# Patient Record
Sex: MALE | Race: WHITE | Employment: FULL TIME | URBAN - METROPOLITAN AREA
[De-identification: names, ages, dates, MRNs, and addresses within clinical notes are randomized per-mention and may not be internally consistent; named-entity substitution may affect disease eponyms.]

---

## 2017-01-19 ENCOUNTER — PATIENT MESSAGE (OUTPATIENT)
Dept: FAMILY MEDICINE CLINIC | Age: 42
End: 2017-01-19

## 2017-02-01 PROBLEM — I87.002 POST-THROMBOTIC SYNDROME OF LEFT LOWER EXTREMITY: Status: ACTIVE | Noted: 2017-02-01

## 2017-02-01 PROBLEM — I82.409 RECURRENT DEEP VEIN THROMBOSIS (DVT) (HCC): Status: ACTIVE | Noted: 2017-02-01

## 2017-02-01 PROBLEM — I87.399 CHRONIC VENOUS HYPERTENSION WITH COMPLICATION: Status: ACTIVE | Noted: 2017-02-01

## 2017-03-14 PROBLEM — I82.429 DEEP VEIN THROMBOSIS (DVT) OF ILIAC VEIN (HCC): Status: ACTIVE | Noted: 2017-03-14

## 2017-03-16 ENCOUNTER — HOSPITAL ENCOUNTER (OUTPATIENT)
Dept: INTERVENTIONAL RADIOLOGY/VASCULAR | Age: 42
Discharge: HOME | End: 2017-03-16
Attending: RADIOLOGY | Admitting: RADIOLOGY

## 2017-03-16 ENCOUNTER — PATIENT MESSAGE (OUTPATIENT)
Dept: FAMILY MEDICINE CLINIC | Age: 42
End: 2017-03-16

## 2017-05-11 ENCOUNTER — OFFICE VISIT (OUTPATIENT)
Dept: FAMILY MEDICINE CLINIC | Age: 42
End: 2017-05-11

## 2017-05-11 VITALS
SYSTOLIC BLOOD PRESSURE: 132 MMHG | BODY MASS INDEX: 33.56 KG/M2 | RESPIRATION RATE: 18 BRPM | OXYGEN SATURATION: 99 % | DIASTOLIC BLOOD PRESSURE: 80 MMHG | HEART RATE: 78 BPM | HEIGHT: 68 IN | WEIGHT: 221.4 LBS

## 2017-05-11 DIAGNOSIS — M54.42 CHRONIC BILATERAL LOW BACK PAIN WITH BILATERAL SCIATICA: Primary | ICD-10-CM

## 2017-05-11 DIAGNOSIS — Z79.01 LONG TERM CURRENT USE OF ANTICOAGULANT THERAPY: ICD-10-CM

## 2017-05-11 DIAGNOSIS — I82.429 DEEP VEIN THROMBOSIS (DVT) OF ILIAC VEIN, UNSPECIFIED CHRONICITY, UNSPECIFIED LATERALITY (HCC): ICD-10-CM

## 2017-05-11 DIAGNOSIS — M54.41 CHRONIC BILATERAL LOW BACK PAIN WITH BILATERAL SCIATICA: Primary | ICD-10-CM

## 2017-05-11 DIAGNOSIS — I82.409 RECURRENT DEEP VEIN THROMBOSIS (DVT) (HCC): ICD-10-CM

## 2017-05-11 DIAGNOSIS — M51.26 HERNIATED LUMBAR DISC WITHOUT MYELOPATHY: ICD-10-CM

## 2017-05-11 DIAGNOSIS — G89.29 CHRONIC BILATERAL LOW BACK PAIN WITH BILATERAL SCIATICA: Primary | ICD-10-CM

## 2017-05-11 PROCEDURE — 99213 OFFICE O/P EST LOW 20 MIN: CPT | Performed by: FAMILY MEDICINE

## 2017-05-11 RX ORDER — GABAPENTIN 100 MG/1
100 CAPSULE ORAL 3 TIMES DAILY
Qty: 270 CAPSULE | Refills: 1 | Status: SHIPPED | OUTPATIENT
Start: 2017-05-11 | End: 2018-01-08 | Stop reason: SDUPTHER

## 2017-05-11 ASSESSMENT — PATIENT HEALTH QUESTIONNAIRE - PHQ9
1. LITTLE INTEREST OR PLEASURE IN DOING THINGS: 0
2. FEELING DOWN, DEPRESSED OR HOPELESS: 0
SUM OF ALL RESPONSES TO PHQ9 QUESTIONS 1 & 2: 0
SUM OF ALL RESPONSES TO PHQ QUESTIONS 1-9: 0

## 2017-08-29 ENCOUNTER — HOSPITAL ENCOUNTER (OUTPATIENT)
Dept: PHYSICAL THERAPY | Age: 42
Discharge: HOME | End: 2017-09-30
Attending: PAIN MEDICINE | Admitting: PAIN MEDICINE

## 2017-09-15 ASSESSMENT — PAIN DESCRIPTION - PAIN TYPE: TYPE: CHRONIC PAIN

## 2017-09-15 ASSESSMENT — PAIN DESCRIPTION - PROGRESSION: CLINICAL_PROGRESSION: GRADUALLY WORSENING

## 2017-09-15 ASSESSMENT — PAIN DESCRIPTION - ONSET: ONSET: ON-GOING

## 2017-09-15 NOTE — PROGRESS NOTES
Physical Therapy  Initial Assessment  Date: 9/15/2017  Patient Name: Hayden Alexis  MRN: [id number]  : 1975     Treatment Diagnosis: Neuro deficits and weakness L leg; sesory loss L leg; exaccerbation of LB and doreen LE pain; chronic edema with blood clots L leg; decreased functional use of LB and L leg    Restrictions  Restrictions/Precautions  Implants present? : Metal implants (Rods in LB)    Subjective   General  Chart Reviewed: Yes  Patient assessed for rehabilitation services?: Yes  Additional Pertinent Hx: Back surgery x 4 episodes;  last back surgery 14 years ago with wilbur placement  Family / Caregiver Present: No  Referring Practitioner: Porter Mcgill  Diagnosis: Spinal Stenosis  Follows Commands: Within Functional Limits  General Comment  Comments: Pt. reports can only sit comfortably x 10 min. , can walk only 30 min before pain increases in back. 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 PT Visit Information  PT Insurance Information: [employer]  Subjective  Subjective: Pt. reports approx. one year ago he developed increasing pain in back and down legs to top of feet. Pt. reports he has numb places in LB and legs. Pt. has drop foot in Left leg. Pt. reports he has had blood clots x 4 episodes in L leg, and he wears compression stockings with long pants and is on blood thinner. Pain Screening  Patient Currently in Pain: Yes  Pain Assessment  Pain Assessment: 0-10  Pain Level:  (Goes from 5/10 and higher.)  Pain Type: Chronic pain  Pain Descriptors: Aching;Tightness;Pins and needles  Pain Onset: On-going  Clinical Progression: Gradually worsening  Effect of Pain on Daily Activities: Limits walking and sitting activities.   Pain Intervention(s):  (TENS, back brace)  Vital Signs  Patient Currently in Pain: Yes    Vision/Hearing  Vision  Vision: Within Functional Limits  Hearing  Hearing: Within functional motor and sensory loss L Leg which impacts on sitting and standing endurance, gait form, and functional use of LB and L LE;patient's goal is to decrease LB and doreen leg pain and to strengthen L leg ;patient reports that weakness and pain  limits activities including those noted; PT to address patient's goals, impairments and activity limitations with skilled interventions checked in plan of care;patient's level of function prior to onset of exacerbation of pain was limited; did not observe any barriers to learning during PT eval; learning preferences include demonstration, practice, and handouts; patient expressed understanding of HEP; patient appears to be motivated to participate in an active PT program and to be compliant with HEP expectations;patient assisted in developing treatment plan and goals; no DME is currently being used;      Current functional level (based on )   : Oswestry  Conditions Requiring Skilled Therapeutic Intervention  Body structures, Functions, Activity limitations: Decreased functional mobility ; Decreased ROM; Decreased strength;Decreased endurance  Treatment Diagnosis: Neuro deficits and weakness L leg; sesory loss L leg; exaccerbation of LB and doreen LE pain; chronic edema with blood clots L leg; decreased functional use of LB and L leg  Prognosis: Fair  Decision Making: Medium Complexity  Patient Education: Posture and body mechanics training; HEP  REQUIRES PT FOLLOW UP: Yes  Discharge Recommendations: Outpatient PT  Activity Tolerance  Activity Tolerance: Patient limited by pain; Patient limited by fatigue         Plan        G-Code       OutComes Score                                                     Goals  Short term goals  Time Frame for Short term goals: 6 wks.   Short term goal 1: Ind with HEP with good compliance  Short term goal 2: Decrease frequecy of extreme pain to once/wk from current daily extreme pain  Short term goal 3: Oswestry 20/50 score       Therapy Time   Individual Concurrent Group Co-treatment   Time In 0940         Time Out  [address] [facility], PT

## 2017-09-15 NOTE — PLAN OF CARE
Oswestry    Plan of Care/Treatment to date:  [x] Therapeutic Exercise    [] Aquatics:  [] Therapeutic Activity    [] Ultrasound  [] Elec Stimulation  [] Gait Training     [] Cervical Traction [] Lumbar Traction  [] Neuromuscular Re-education [] Cold/hotpack [] Iontophoresis   [x] Instruction in HEP       [x] Manual Therapy     [] vasopneumatic            [x] Self care home management        []Dry needling trigger point point/pain management    ? Frequency/Duration:  # Days per week: [] 1 day # Weeks: [] 1 week [x] 5 weeks     [x] 2 days? [] 2 weeks [] 6 weeks     [] 3 days   [] 3 weeks [] 7 weeks     [] 4 days   [] 4 weeks [] 8 weeks    Rehab Potential/Progress: [] Excellent [] Good [] Fair  [] Poor     Goals:    Short term goals  Time Frame for Short term goals: 6 wks. Short term goal 1: Ind with HEP with good compliance  Short term goal 2: Decrease frequecy of extreme pain to once/wk from current daily extreme pain  Short term goal 3: Oswestry 20/50 score     Electronically signed by:  Carmina Carcamo, PT, 9/15/2017, 3:23 PM              If you have any questions or concerns, please don't hesitate to call.   Thank you for your referral.      Physician Signature:_________________Date:____________Time: ________  By signing above, therapists plan is approved by physician

## 2017-10-01 ENCOUNTER — HOSPITAL ENCOUNTER (OUTPATIENT)
Dept: OTHER | Age: 42
Discharge: HOME | End: 2017-10-11
Attending: PAIN MEDICINE | Admitting: PAIN MEDICINE

## 2017-11-15 ENCOUNTER — TELEPHONE (OUTPATIENT)
Dept: FAMILY MEDICINE CLINIC | Age: 42
End: 2017-11-15

## 2017-11-15 NOTE — TELEPHONE ENCOUNTER
11-14-17 Patient was no call no show for todays appt.  I Called patient at home no answer LM on  requesting patient call and reschedule this appt (EV)

## 2017-12-14 ENCOUNTER — OFFICE VISIT (OUTPATIENT)
Dept: FAMILY MEDICINE CLINIC | Age: 42
End: 2017-12-14

## 2017-12-14 VITALS
TEMPERATURE: 97.6 F | HEIGHT: 68 IN | BODY MASS INDEX: 34.43 KG/M2 | SYSTOLIC BLOOD PRESSURE: 126 MMHG | OXYGEN SATURATION: 96 % | WEIGHT: 227.2 LBS | DIASTOLIC BLOOD PRESSURE: 86 MMHG | HEART RATE: 86 BPM

## 2017-12-14 DIAGNOSIS — J20.9 ACUTE BRONCHITIS, UNSPECIFIED ORGANISM: Primary | ICD-10-CM

## 2017-12-14 PROCEDURE — 99213 OFFICE O/P EST LOW 20 MIN: CPT | Performed by: FAMILY MEDICINE

## 2017-12-14 RX ORDER — METHYLPREDNISOLONE 4 MG/1
TABLET ORAL
Qty: 1 KIT | Refills: 0 | Status: SHIPPED | OUTPATIENT
Start: 2017-12-14 | End: 2018-01-08 | Stop reason: ALTCHOICE

## 2017-12-14 RX ORDER — DEXTROMETHORPHAN HYDROBROMIDE AND PROMETHAZINE HYDROCHLORIDE 15; 6.25 MG/5ML; MG/5ML
5 SYRUP ORAL EVERY 6 HOURS PRN
Qty: 118 ML | Refills: 0 | Status: SHIPPED | OUTPATIENT
Start: 2017-12-14 | End: 2017-12-21

## 2017-12-14 RX ORDER — CELECOXIB 100 MG/1
CAPSULE ORAL
COMMUNITY
Start: 2017-11-02 | End: 2018-01-08 | Stop reason: CLARIF

## 2017-12-14 RX ORDER — TIZANIDINE 4 MG/1
TABLET ORAL
COMMUNITY
Start: 2017-12-11 | End: 2018-01-08 | Stop reason: SDUPTHER

## 2017-12-14 RX ORDER — AZITHROMYCIN 250 MG/1
TABLET, FILM COATED ORAL
Qty: 1 PACKET | Refills: 0 | Status: SHIPPED | OUTPATIENT
Start: 2017-12-14 | End: 2018-01-08 | Stop reason: ALTCHOICE

## 2017-12-14 ASSESSMENT — ENCOUNTER SYMPTOMS
BACK PAIN: 0
VOMITING: 0
EYE DISCHARGE: 0
SHORTNESS OF BREATH: 0
COUGH: 1
DIARRHEA: 0
WHEEZING: 0
SINUS PRESSURE: 0
NAUSEA: 0
SORE THROAT: 0

## 2017-12-14 NOTE — PATIENT INSTRUCTIONS
Patient Education        Bronchitis: Care Instructions  Your Care Instructions    Bronchitis is inflammation of the bronchial tubes, which carry air to the lungs. The tubes swell and produce mucus, or phlegm. The mucus and inflamed bronchial tubes make you cough. You may have trouble breathing. Most cases of bronchitis are caused by viruses like those that cause colds. Antibiotics usually do not help and they may be harmful. Bronchitis usually develops rapidly and lasts about 2 to 3 weeks in otherwise healthy people. Follow-up care is a key part of your treatment and safety. Be sure to make and go to all appointments, and call your doctor if you are having problems. It's also a good idea to know your test results and keep a list of the medicines you take. How can you care for yourself at home? · Take all medicines exactly as prescribed. Call your doctor if you think you are having a problem with your medicine. · Get some extra rest.  · Take an over-the-counter pain medicine, such as acetaminophen (Tylenol), ibuprofen (Advil, Motrin), or naproxen (Aleve) to reduce fever and relieve body aches. Read and follow all instructions on the label. · Do not take two or more pain medicines at the same time unless the doctor told you to. Many pain medicines have acetaminophen, which is Tylenol. Too much acetaminophen (Tylenol) can be harmful. · Take an over-the-counter cough medicine that contains dextromethorphan to help quiet a dry, hacking cough so that you can sleep. Avoid cough medicines that have more than one active ingredient. Read and follow all instructions on the label. · Breathe moist air from a humidifier, hot shower, or sink filled with hot water. The heat and moisture will thin mucus so you can cough it out. · Do not smoke. Smoking can make bronchitis worse. If you need help quitting, talk to your doctor about stop-smoking programs and medicines.  These can increase your chances of quitting for good.  When should you call for help? Call 911 anytime you think you may need emergency care. For example, call if:  ? · You have severe trouble breathing. ?Call your doctor now or seek immediate medical care if:  ? · You have new or worse trouble breathing. ? · You cough up dark brown or bloody mucus (sputum). ? · You have a new or higher fever. ? · You have a new rash. ? Watch closely for changes in your health, and be sure to contact your doctor if:  ? · You cough more deeply or more often, especially if you notice more mucus or a change in the color of your mucus. ? · You are not getting better as expected. Where can you learn more? Go to [email or web address]://[email or web address].[email or web address]. org and sign in to your Mochila account. Enter H333 in the Lasso Logic box to learn more about \"Bronchitis: Care Instructions. \"     If you do not have an account, please click on the \"Sign Up Now\" link. Current as of: May 12, 2017  Content Version: 11.4  © 0982-3759 Healthwise, Incorporated. Care instructions adapted under license by [facility] [place] ([facility]). If you have questions about a medical condition or this instruction, always ask your healthcare professional. [address] any warranty or liability for your use of this information. We are committed to providing you the best care possible. If you receive a survey after visiting one of our offices, please take time to share your experience concerning your physician office visit. These surveys are confidential and no health information about you is shared. We are eager to improve for you and we are counting on your feedback to help make that happen.

## 2017-12-14 NOTE — PROGRESS NOTES
[facility]  1975  38 y.o. SUBJECT JV:    Chief Complaint   Patient presents with    Cough     x 2 weeks,dry cough, has tried Nyquil, Dayquil, sinus medicine. They didn't help. Denies fever.  Pharyngitis     x 2 weeks, intermittent. Patient with above. Better and then qworse in last 4 days. Cough has increased with headache as  Result. Dry non productive cough. No known sick contacts. Cough   This is a new problem. The current episode started 1 to 4 weeks ago (2 weeks). The problem has been gradually worsening. The problem occurs every few minutes. The cough is non-productive. Associated symptoms include chills and nasal congestion. Pertinent negatives include no chest pain, ear pain, fever, headaches, rash, sore throat, shortness of breath or wheezing. Nothing aggravates the symptoms. He has tried OTC cough suppressant and rest for the symptoms. The treatment provided no relief. There is no history of asthma, bronchitis or environmental allergies. Review of Systems   Constitutional: Positive for chills. Negative for fatigue and fever. HENT: Negative for congestion, ear pain, sinus pressure and sore throat. Eyes: Negative for discharge and visual disturbance. Respiratory: Positive for cough. Negative for shortness of breath and wheezing. Cardiovascular: Negative for chest pain and leg swelling. Gastrointestinal: Negative for diarrhea, nausea and vomiting. Genitourinary: Negative for dysuria, frequency and hematuria. Musculoskeletal: Negative for back pain and gait problem. Skin: Negative for rash. Allergic/Immunologic: Negative for environmental allergies and food allergies. Neurological: Negative for dizziness and headaches. Psychiatric/Behavioral: Negative for behavioral problems, sleep disturbance and suicidal ideas. The patient is not nervous/anxious. Past Medical, Family, and Social History have been reviewed today.     OBJECTIVE:     /86 Pulse 86   Temp 97.6 °F (36.4 °C) (Oral)   Ht 5' 7.75\" (1.721 m)   Wt 227 lb 3.2 oz (103.1 kg)   SpO2 96%   BMI 34.80 kg/m²     Physical Exam   Constitutional: He is oriented to person, place, and time. He appears well-developed and well-nourished. He appears ill. HENT:   Head: Normocephalic and atraumatic. Right Ear: External ear normal.   Left Ear: External ear normal.   Nose: Nose normal.   Mouth/Throat: Oropharynx is clear and moist.   Eyes: Conjunctivae and EOM are normal. Pupils are equal, round, and reactive to light. No scleral icterus. Neck: Normal range of motion. Neck supple. Cardiovascular: Normal rate, regular rhythm, normal heart sounds and intact distal pulses. Exam reveals no gallop and no friction rub. No murmur heard. Pulmonary/Chest: Effort normal. No respiratory distress. He has decreased breath sounds in the right upper field, the right middle field, the right lower field, the left upper field, the left middle field and the left lower field. He has no wheezes. He has no rales. Musculoskeletal: Normal range of motion. Neurological: He is alert and oriented to person, place, and time. Skin: Skin is warm and dry. No rash noted. Psychiatric: He has a normal mood and affect. His behavior is normal. Judgment and thought content normal.         ASSESSMENT/ PLAN:    1. Acute bronchitis, unspecified organism  Patient with symptoms for 2 weeks. Will treat with OMI manning as this has been helpful in the past, with Medrol dose kerri and cough syrup. Patient encouraged to increase fluids and rest. Will call early next week to see how he is feeling.   - azithromycin (ZITHROMAX) 250 MG tablet; Take 2 tabs (500 mg) on Day 1, and take 1 tab (250 mg) on days 2 through 5. Dispense: 1 packet; Refill: 0  - methylPREDNISolone (MEDROL DOSEPACK) 4 MG tablet; Take by mouth. Dispense: 1 kit; Refill: 0  - promethazine-dextromethorphan (PROMETHAZINE-DM) 6.25-15 MG/5ML syrup;  Take 5 mLs by mouth every 6

## 2017-12-15 DIAGNOSIS — N52.02 CORPORO-VENOUS OCCLUSIVE ERECTILE DYSFUNCTION: ICD-10-CM

## 2017-12-15 RX ORDER — SILDENAFIL 50 MG/1
50 TABLET, FILM COATED ORAL PRN
Qty: 30 TABLET | Refills: 3 | Status: SHIPPED | OUTPATIENT
Start: 2017-12-15 | End: 2017-12-31 | Stop reason: SDUPTHER

## 2017-12-15 NOTE — TELEPHONE ENCOUNTER
From: Bibiana Correa  Sent: 12/14/2017 4:12 PM EST  Subject: Medication Renewal Request    Bibiana Correa would like a refill of the following medications:  sildenafil (VIAGRA) 50 MG tablet Ivory Haider MD]    Preferred pharmacy: [address] [place] [address] , [address] [facility] [phone] - F [phone]    Comment:

## 2017-12-18 ENCOUNTER — TELEPHONE (OUTPATIENT)
Dept: FAMILY MEDICINE CLINIC | Age: 42
End: 2017-12-18

## 2017-12-18 NOTE — TELEPHONE ENCOUNTER
Called patient to follow up on OV 12/14/2017. States that he is doing a lot better and will call with any issues or concerns.

## 2017-12-20 ENCOUNTER — PATIENT MESSAGE (OUTPATIENT)
Dept: FAMILY MEDICINE CLINIC | Age: 42
End: 2017-12-20

## 2017-12-20 DIAGNOSIS — Z79.01 LONG TERM CURRENT USE OF ANTICOAGULANT THERAPY: ICD-10-CM

## 2017-12-20 DIAGNOSIS — I82.402 DVT, RECURRENT, LOWER EXTREMITY, ACUTE, LEFT (HCC): ICD-10-CM

## 2017-12-20 NOTE — TELEPHONE ENCOUNTER
From: Mendel Spaniel  To: Karoline Mary MD  Sent: 12/20/2017 5:06 PM EST  Subject: Prescription Question    Went to get this month's prodaxa prescription and they said I no longer have prescription. I am completely out of prodaxa now. Can you call in New prodaxa prescription?  Thanks

## 2017-12-22 RX ORDER — DABIGATRAN ETEXILATE 150 MG/1
CAPSULE, COATED PELLETS ORAL
Qty: 180 CAPSULE | Refills: 1 | Status: SHIPPED | OUTPATIENT
Start: 2017-12-22 | End: 2018-01-08 | Stop reason: CLARIF

## 2017-12-31 DIAGNOSIS — N52.02 CORPORO-VENOUS OCCLUSIVE ERECTILE DYSFUNCTION: ICD-10-CM

## 2018-01-02 RX ORDER — SILDENAFIL CITRATE 50 MG
TABLET ORAL
Qty: 30 TABLET | Refills: 1 | Status: SHIPPED | OUTPATIENT
Start: 2018-01-02 | End: 2020-07-31 | Stop reason: SDUPTHER

## 2018-01-04 ENCOUNTER — PATIENT MESSAGE (OUTPATIENT)
Dept: FAMILY MEDICINE CLINIC | Age: 43
End: 2018-01-04

## 2018-01-08 ENCOUNTER — OFFICE VISIT (OUTPATIENT)
Dept: FAMILY MEDICINE CLINIC | Age: 43
End: 2018-01-08

## 2018-01-08 VITALS
RESPIRATION RATE: 18 BRPM | HEART RATE: 81 BPM | OXYGEN SATURATION: 98 % | BODY MASS INDEX: 35.22 KG/M2 | HEIGHT: 68 IN | SYSTOLIC BLOOD PRESSURE: 136 MMHG | DIASTOLIC BLOOD PRESSURE: 84 MMHG | WEIGHT: 232.4 LBS

## 2018-01-08 DIAGNOSIS — M54.16 BILATERAL LUMBAR RADICULOPATHY: ICD-10-CM

## 2018-01-08 DIAGNOSIS — Z79.01 LONG TERM CURRENT USE OF ANTICOAGULANT THERAPY: Primary | ICD-10-CM

## 2018-01-08 DIAGNOSIS — I82.409 RECURRENT DEEP VEIN THROMBOSIS (DVT) (HCC): ICD-10-CM

## 2018-01-08 DIAGNOSIS — R76.0 LUPUS ANTICOAGULANT POSITIVE: ICD-10-CM

## 2018-01-08 DIAGNOSIS — M54.42 CHRONIC BILATERAL LOW BACK PAIN WITH BILATERAL SCIATICA: Primary | ICD-10-CM

## 2018-01-08 DIAGNOSIS — J01.00 ACUTE NON-RECURRENT MAXILLARY SINUSITIS: ICD-10-CM

## 2018-01-08 DIAGNOSIS — M54.41 CHRONIC BILATERAL LOW BACK PAIN WITH BILATERAL SCIATICA: Primary | ICD-10-CM

## 2018-01-08 DIAGNOSIS — G89.29 CHRONIC BILATERAL LOW BACK PAIN WITH BILATERAL SCIATICA: Primary | ICD-10-CM

## 2018-01-08 DIAGNOSIS — Z23 NEED FOR INFLUENZA VACCINATION: ICD-10-CM

## 2018-01-08 DIAGNOSIS — M54.17 LUMBOSACRAL RADICULOPATHY AT L5: ICD-10-CM

## 2018-01-08 DIAGNOSIS — M21.372 FOOT DROP, LEFT: ICD-10-CM

## 2018-01-08 PROCEDURE — 90686 IIV4 VACC NO PRSV 0.5 ML IM: CPT | Performed by: FAMILY MEDICINE

## 2018-01-08 PROCEDURE — 90471 IMMUNIZATION ADMIN: CPT | Performed by: FAMILY MEDICINE

## 2018-01-08 PROCEDURE — 99213 OFFICE O/P EST LOW 20 MIN: CPT | Performed by: FAMILY MEDICINE

## 2018-01-08 RX ORDER — GABAPENTIN 100 MG/1
100 CAPSULE ORAL 3 TIMES DAILY
Qty: 360 CAPSULE | Refills: 3 | Status: SHIPPED | OUTPATIENT
Start: 2018-01-08 | End: 2018-03-20 | Stop reason: SDUPTHER

## 2018-01-08 RX ORDER — DULOXETIN HYDROCHLORIDE 60 MG/1
60 CAPSULE, DELAYED RELEASE ORAL DAILY
Qty: 90 CAPSULE | Refills: 3 | Status: SHIPPED | OUTPATIENT
Start: 2018-01-08 | End: 2018-03-02 | Stop reason: SDUPTHER

## 2018-01-08 RX ORDER — TIZANIDINE 4 MG/1
4 TABLET ORAL EVERY 8 HOURS PRN
Qty: 90 TABLET | Refills: 3 | Status: SHIPPED | OUTPATIENT
Start: 2018-01-08 | End: 2019-01-08 | Stop reason: ALTCHOICE

## 2018-02-25 ENCOUNTER — PATIENT MESSAGE (OUTPATIENT)
Dept: FAMILY MEDICINE CLINIC | Age: 43
End: 2018-02-25

## 2018-02-25 DIAGNOSIS — M54.16 BILATERAL LUMBAR RADICULOPATHY: ICD-10-CM

## 2018-02-25 DIAGNOSIS — M21.372 FOOT DROP, LEFT: ICD-10-CM

## 2018-02-25 DIAGNOSIS — M54.17 LUMBOSACRAL RADICULOPATHY AT L5: ICD-10-CM

## 2018-02-25 DIAGNOSIS — M54.41 CHRONIC BILATERAL LOW BACK PAIN WITH BILATERAL SCIATICA: ICD-10-CM

## 2018-02-25 DIAGNOSIS — G89.29 CHRONIC BILATERAL LOW BACK PAIN WITH BILATERAL SCIATICA: ICD-10-CM

## 2018-02-25 DIAGNOSIS — M54.42 CHRONIC BILATERAL LOW BACK PAIN WITH BILATERAL SCIATICA: ICD-10-CM

## 2018-02-26 NOTE — TELEPHONE ENCOUNTER
From: Donnis Nageotte  To: Rolando Brandt MD  Sent: 2/25/2018 9:54 PM EST  Subject: Prescription Question    You said to let you know. Nothing is really helping the pain. Levels normally at 8. Peek to 10 and beyond. After all meds maybe 7.5. 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 Can you give me something that will help me get past the peek levels daily so I can continue working? I've tried alot. Nothing is perfect. But, Percocets seem to work the best. I know I didn't want these in the past. But, I've got to figure out something that works for me. Please, call in script.  Thank you

## 2018-03-02 RX ORDER — DULOXETIN HYDROCHLORIDE 30 MG/1
90 CAPSULE, DELAYED RELEASE ORAL DAILY
Qty: 90 CAPSULE | Refills: 1 | Status: SHIPPED | OUTPATIENT
Start: 2018-03-02 | End: 2018-03-20 | Stop reason: SDUPTHER

## 2018-03-02 RX ORDER — METHOCARBAMOL 750 MG/1
750 TABLET, FILM COATED ORAL EVERY 6 HOURS PRN
Qty: 90 TABLET | Refills: 5 | Status: SHIPPED | OUTPATIENT
Start: 2018-03-02 | End: 2018-08-17 | Stop reason: SDUPTHER

## 2018-03-04 ENCOUNTER — PATIENT MESSAGE (OUTPATIENT)
Dept: FAMILY MEDICINE CLINIC | Age: 43
End: 2018-03-04

## 2018-03-05 NOTE — TELEPHONE ENCOUNTER
From: Eusebio Negrete  To: Michael Sorenson MD  Sent: 3/4/2018 5:30 PM EST  Subject: RE: RE: Prescription Question    I will need more Cymbalta my script is only for 30 a month. I have switched the others. Thank you.     ----- Message -----  From: Michael Sorenson MD  Sent: 3/2/18 1:24 PM  To: Arthurosmel Negrete  Subject: RE: RE: Prescription Question    Start robaxin to replace zanaflex  Please increase your cymbalta to 90mg daily by taking 3 capsules of the 30mg daily. Meds were sent to [facility]     The next med change if needed in the next 4-6 weeks will be to increase your gabapentin up to and eventual 600mg three times per day along with the above, as long as your don't have side effects           ----- Message -----   From: Eusebio Negrete   Sent: 2/26/2018 4:02 PM EST   To: Michael Sorenson MD  Subject: RE: RE: Prescription Question    You know me. I'm not fond of pills. And I was under the impression that any pill that ends with \"cet\" is bad for your liver anyways. I will try anything. Please, tell me what you recommend and I'll try. It just has to work better than where I'm at now. Thanks for all your help as always.     ----- Message -----  From: Michael Sorenson MD  Sent: 2/26/18 2:48 PM  To: Eusebio Negrete  Subject: RE: Prescription Question    Hi Radha Awad,   I no longer start chronic prescriptions for Percocets or other chronic opioids and refer my patients to the pain specialist colleagues to do that. However, I can helpf  1. Titrate your gabapentin eventually to 600mg three times per day or switch that to Lyrica  2. Add a change your muscle relaxer to flexeril or robaxin  3. Increase your cymbalta to 90mg total daily. Let me know you would like to pursue those other options #1-3 above.     DR.D Donald      ----- Message -----   From: Eusebio Negrete   Sent: 2/25/2018 9:54 PM EST   To: Michael Sorenson MD  Subject: Prescription

## 2018-03-20 ENCOUNTER — PATIENT MESSAGE (OUTPATIENT)
Dept: FAMILY MEDICINE CLINIC | Age: 43
End: 2018-03-20

## 2018-03-20 DIAGNOSIS — M21.372 FOOT DROP, LEFT: ICD-10-CM

## 2018-03-20 DIAGNOSIS — M54.17 LUMBOSACRAL RADICULOPATHY AT L5: ICD-10-CM

## 2018-03-20 DIAGNOSIS — G89.29 CHRONIC BILATERAL LOW BACK PAIN WITH BILATERAL SCIATICA: ICD-10-CM

## 2018-03-20 DIAGNOSIS — M54.16 BILATERAL LUMBAR RADICULOPATHY: ICD-10-CM

## 2018-03-20 DIAGNOSIS — M54.41 CHRONIC BILATERAL LOW BACK PAIN WITH BILATERAL SCIATICA: ICD-10-CM

## 2018-03-20 DIAGNOSIS — M54.42 CHRONIC BILATERAL LOW BACK PAIN WITH BILATERAL SCIATICA: ICD-10-CM

## 2018-03-20 RX ORDER — DULOXETIN HYDROCHLORIDE 30 MG/1
90 CAPSULE, DELAYED RELEASE ORAL DAILY
Qty: 270 CAPSULE | Refills: 1 | Status: SHIPPED | OUTPATIENT
Start: 2018-03-20 | End: 2018-11-04 | Stop reason: SDUPTHER

## 2018-03-20 RX ORDER — GABAPENTIN 300 MG/1
300 CAPSULE ORAL 3 TIMES DAILY
Qty: 270 CAPSULE | Refills: 3 | Status: SHIPPED | OUTPATIENT
Start: 2018-03-20 | End: 2019-01-08 | Stop reason: SDUPTHER

## 2018-03-20 NOTE — TELEPHONE ENCOUNTER
From: Jose Armando Marie  To: Jeffry Markham MD  Sent: 3/20/2018 7:56 AM EDT  Subject: RE: RE: Prescription Question    I have ran out of Zolloon. I am taking 2 in morning, 2 mid day, and 3 at bedtime. I increased cymbalta as asked. Still in a lot of pain. Burning pain from mid back to toes. Aching in back with random severe shooting, stabbing pain. Sharp, pinching pain in hips. Fatigue in back and legs. All increases as day progresses. Up all night tossing unable to get comfortable.         ----- Message -----  From: Jeffry Markham MD  Sent: 3/5/18 1:50 PM  To: Jose Armando Marie  Subject: RE: RE: Prescription Question    I sent 90 capsules of the cymbalta 30mg doses back on 3/2/2018- Take 3 capsules daily and there should be a refill. Let me know if it helps and then I can send a 90 day supply. ----- Message -----   From: Jose Armando Marie   Sent: 3/4/2018 5:30 PM EST   To: Jeffry Markham MD  Subject: RE: RE: Prescription Question    I will need more Cymbalta my script is only for 30 a month. I have switched the others. Thank you.     ----- Message -----  From: Jeffry Markham MD  Sent: 3/2/18 1:24 PM  To: Jose Armando Marie  Subject: RE: RE: Prescription Question    Start robaxin to replace zanaflex  Please increase your cymbalta to 90mg daily by taking 3 capsules of the 30mg daily. Meds were sent to [facility]     The next med change if needed in the next 4-6 weeks will be to increase your gabapentin up to and eventual 600mg three times per day along with the above, as long as your don't have side effects           ----- Message -----   From: Jose Armando Marie   Sent: 2/26/2018 4:02 PM EST   To: Jeffry Markham MD  Subject: RE: RE: Prescription Question    You know me. I'm not fond of pills. And I was under the impression that any pill that ends with \"cet\" is bad for your liver anyways. I will try anything.  Please, tell me what you recommend

## 2018-05-10 PROBLEM — J30.9 CHRONIC ALLERGIC RHINITIS: Status: ACTIVE | Noted: 2018-05-10

## 2018-07-09 ENCOUNTER — OFFICE VISIT (OUTPATIENT)
Dept: FAMILY MEDICINE CLINIC | Age: 43
End: 2018-07-09

## 2018-07-09 VITALS
BODY MASS INDEX: 34.68 KG/M2 | SYSTOLIC BLOOD PRESSURE: 122 MMHG | WEIGHT: 228.8 LBS | HEIGHT: 68 IN | HEART RATE: 85 BPM | DIASTOLIC BLOOD PRESSURE: 86 MMHG | OXYGEN SATURATION: 95 %

## 2018-07-09 DIAGNOSIS — H00.015 HORDEOLUM EXTERNUM OF LEFT LOWER EYELID: ICD-10-CM

## 2018-07-09 DIAGNOSIS — M54.41 CHRONIC BILATERAL LOW BACK PAIN WITH BILATERAL SCIATICA: Primary | ICD-10-CM

## 2018-07-09 DIAGNOSIS — J30.89 PERENNIAL ALLERGIC RHINITIS WITH SEASONAL VARIATION: ICD-10-CM

## 2018-07-09 DIAGNOSIS — G89.29 CHRONIC BILATERAL LOW BACK PAIN WITH BILATERAL SCIATICA: Primary | ICD-10-CM

## 2018-07-09 DIAGNOSIS — M54.42 CHRONIC BILATERAL LOW BACK PAIN WITH BILATERAL SCIATICA: Primary | ICD-10-CM

## 2018-07-09 DIAGNOSIS — J30.2 PERENNIAL ALLERGIC RHINITIS WITH SEASONAL VARIATION: ICD-10-CM

## 2018-07-09 PROCEDURE — 99213 OFFICE O/P EST LOW 20 MIN: CPT | Performed by: FAMILY MEDICINE

## 2018-07-09 RX ORDER — MONTELUKAST SODIUM 10 MG/1
10 TABLET ORAL DAILY
Qty: 30 TABLET | Refills: 5 | Status: SHIPPED | OUTPATIENT
Start: 2018-07-09 | End: 2020-08-11 | Stop reason: ALTCHOICE

## 2018-07-09 ASSESSMENT — PATIENT HEALTH QUESTIONNAIRE - PHQ9
2. FEELING DOWN, DEPRESSED OR HOPELESS: 0
SUM OF ALL RESPONSES TO PHQ QUESTIONS 1-9: 0
SUM OF ALL RESPONSES TO PHQ9 QUESTIONS 1 & 2: 0
1. LITTLE INTEREST OR PLEASURE IN DOING THINGS: 0

## 2018-07-09 NOTE — PATIENT INSTRUCTIONS
Patient Education        Styes and Chalazia: Care Instructions  Your Care Instructions    Styes and chalazia (say \"[id number]-[id number]\") are both conditions that can cause swelling of the eyelid. A stye is an infection in the root of an eyelash. The infection causes a tender red lump on the edge of the eyelid. The infection can spread until the whole eyelid becomes red and inflamed. Styes usually break open, and a tiny amount of pus drains. They usually clear up on their own in about a week, but they sometimes need treatment with antibiotics. A chalazion is a lump or cyst in the eyelid (chalazion is singular; chalazia is plural). It is caused by swelling and inflammation of deep oil glands inside the eyelid. Chalazia are usually not infected. They can take a few months to heal.  If a chalazion becomes more swollen and painful or does not go away, you may need to have it drained by your doctor. Follow-up care is a key part of your treatment and safety. Be sure to make and go to all appointments, and call your doctor if you are having problems. It's also a good idea to know your test results and keep a list of the medicines you take. How can you care for yourself at home? · Do not rub your eyes. Do not squeeze or try to open a stye or chalazion. · To help a stye or chalazion heal faster:  ¨ Put a warm, moist compress on your eye for 5 to 10 minutes, 3 to 6 times a day. Heat often brings a stye to a point where it drains on its own. Keep in mind that warm compresses will often increase swelling a little at first.  ¨ Do not use hot water or heat a wet cloth in a microwave oven. The compress may get too hot and can burn the eyelid. · Always wash your hands before and after you use a compress or touch your eyes. · If the doctor gave you antibiotic drops or ointment, use the medicine exactly as directed. Use the medicine for as long as instructed, even if your eye starts to feel better.   · To put in eyedrops or ointment:  ¨ Tilt your head back, and pull your lower eyelid down with one finger. ¨ Drop or squirt the medicine inside the lower lid. ¨ Close your eye for 30 to 60 seconds to let the drops or ointment move around. ¨ Do not touch the ointment or dropper tip to your eyelashes or any other surface. · Do not wear eye makeup or contact lenses until the stye or chalazion heals. · Do not share towels, pillows, or washcloths while you have a stye. When should you call for help? Call your doctor now or seek immediate medical care if:    · You have pain in your eye.     · You have a change in vision or loss of vision.     · Redness and swelling get much worse.    Watch closely for changes in your health, and be sure to contact your doctor if:    · Your stye does not get better in 1 week.     · Your chalazion does not start to get better after several weeks. Where can you learn more? Go to https://[email or web address].[email or web address]. org and sign in to your Novaliq account. Enter O318 in the AllDigital box to learn more about \"Styes and Chalazia: Care Instructions. \"     If you do not have an account, please click on the \"Sign Up Now\" link. Current as of: December 3, 2017  Content Version: 11.6  © 5694-8725 [employer]. Care instructions adapted under license by [facility] ([facility]). If you have questions about a medical condition or this instruction, always ask your healthcare professional. [place] [address] any warranty or liability for your use of this information.

## 2018-08-17 RX ORDER — METHOCARBAMOL 750 MG/1
TABLET, FILM COATED ORAL
Qty: 90 TABLET | Refills: 5 | Status: SHIPPED | OUTPATIENT
Start: 2018-08-17 | End: 2019-01-08 | Stop reason: ALTCHOICE

## 2018-09-04 ENCOUNTER — PATIENT MESSAGE (OUTPATIENT)
Dept: FAMILY MEDICINE CLINIC | Age: 43
End: 2018-09-04

## 2018-09-04 NOTE — TELEPHONE ENCOUNTER
From: Brenda Juarez  To: Samy Torres MD  Sent: 9/4/2018 10:51 AM EDT  Subject: Prescription Question    Dr. Sukumar Pena can you fill my percocet prescription? Dr. Brooke Felder put me on 5mg. I think I need a higher mg but it has helped more than anything else. I am taking 2 every 8 hours. Do I continue to take my scripts from you or just the percocets? It would be much easier if you could do my scripts because I cant make it to all the monthly appointments do to work. I'm still trying to continue to work and I'm in and out of town all the time. Please, let me know so I can cancel with him if so.  thanks

## 2018-10-23 ENCOUNTER — PATIENT MESSAGE (OUTPATIENT)
Dept: FAMILY MEDICINE CLINIC | Age: 43
End: 2018-10-23

## 2018-11-04 DIAGNOSIS — M54.16 BILATERAL LUMBAR RADICULOPATHY: ICD-10-CM

## 2018-11-04 DIAGNOSIS — M21.372 FOOT DROP, LEFT: ICD-10-CM

## 2018-11-04 DIAGNOSIS — G89.29 CHRONIC BILATERAL LOW BACK PAIN WITH BILATERAL SCIATICA: ICD-10-CM

## 2018-11-04 DIAGNOSIS — M54.41 CHRONIC BILATERAL LOW BACK PAIN WITH BILATERAL SCIATICA: ICD-10-CM

## 2018-11-04 DIAGNOSIS — M54.42 CHRONIC BILATERAL LOW BACK PAIN WITH BILATERAL SCIATICA: ICD-10-CM

## 2018-11-04 DIAGNOSIS — M54.17 LUMBOSACRAL RADICULOPATHY AT L5: ICD-10-CM

## 2018-11-06 RX ORDER — MONTELUKAST SODIUM 10 MG/1
TABLET ORAL
Qty: 90 TABLET | Refills: 1 | Status: SHIPPED | OUTPATIENT
Start: 2018-11-06 | End: 2019-01-08

## 2018-11-06 RX ORDER — DULOXETIN HYDROCHLORIDE 30 MG/1
CAPSULE, DELAYED RELEASE ORAL
Qty: 270 CAPSULE | Refills: 1 | Status: SHIPPED | OUTPATIENT
Start: 2018-11-06 | End: 2019-01-08 | Stop reason: SDUPTHER

## 2019-01-08 ENCOUNTER — OFFICE VISIT (OUTPATIENT)
Dept: FAMILY MEDICINE CLINIC | Age: 44
End: 2019-01-08
Payer: COMMERCIAL

## 2019-01-08 VITALS
DIASTOLIC BLOOD PRESSURE: 72 MMHG | OXYGEN SATURATION: 99 % | SYSTOLIC BLOOD PRESSURE: 126 MMHG | HEART RATE: 88 BPM | HEIGHT: 68 IN | BODY MASS INDEX: 35.16 KG/M2 | RESPIRATION RATE: 14 BRPM | WEIGHT: 232 LBS

## 2019-01-08 DIAGNOSIS — Z79.01 LONG TERM CURRENT USE OF ANTICOAGULANT THERAPY: ICD-10-CM

## 2019-01-08 DIAGNOSIS — Z23 NEED FOR INFLUENZA VACCINATION: ICD-10-CM

## 2019-01-08 DIAGNOSIS — Z79.891 LONG TERM (CURRENT) USE OF OPIATE ANALGESIC: ICD-10-CM

## 2019-01-08 DIAGNOSIS — M54.42 CHRONIC BILATERAL LOW BACK PAIN WITH BILATERAL SCIATICA: ICD-10-CM

## 2019-01-08 DIAGNOSIS — G89.29 CHRONIC BILATERAL LOW BACK PAIN WITH BILATERAL SCIATICA: ICD-10-CM

## 2019-01-08 DIAGNOSIS — I82.409 RECURRENT DEEP VEIN THROMBOSIS (DVT) (HCC): Primary | ICD-10-CM

## 2019-01-08 DIAGNOSIS — M54.41 CHRONIC BILATERAL LOW BACK PAIN WITH BILATERAL SCIATICA: ICD-10-CM

## 2019-01-08 PROBLEM — I82.429 DEEP VEIN THROMBOSIS (DVT) OF ILIAC VEIN (HCC): Status: RESOLVED | Noted: 2017-03-14 | Resolved: 2019-01-08

## 2019-01-08 PROCEDURE — 90686 IIV4 VACC NO PRSV 0.5 ML IM: CPT | Performed by: FAMILY MEDICINE

## 2019-01-08 PROCEDURE — 99214 OFFICE O/P EST MOD 30 MIN: CPT | Performed by: FAMILY MEDICINE

## 2019-01-08 PROCEDURE — 90471 IMMUNIZATION ADMIN: CPT | Performed by: FAMILY MEDICINE

## 2019-01-08 RX ORDER — OXYCODONE AND ACETAMINOPHEN 7.5; 325 MG/1; MG/1
TABLET ORAL
Refills: 0 | COMMUNITY
Start: 2018-12-19 | End: 2020-08-11 | Stop reason: ALTCHOICE

## 2019-01-08 RX ORDER — DULOXETIN HYDROCHLORIDE 30 MG/1
CAPSULE, DELAYED RELEASE ORAL
Qty: 270 CAPSULE | Refills: 3 | Status: SHIPPED | OUTPATIENT
Start: 2019-01-08 | End: 2020-08-11 | Stop reason: ALTCHOICE

## 2019-01-08 RX ORDER — GABAPENTIN 300 MG/1
300 CAPSULE ORAL 2 TIMES DAILY
Qty: 180 CAPSULE | Refills: 3 | Status: SHIPPED | OUTPATIENT
Start: 2019-01-08 | End: 2020-07-31 | Stop reason: SDUPTHER

## 2019-01-08 RX ORDER — DABIGATRAN ETEXILATE 150 MG/1
CAPSULE, COATED PELLETS ORAL
COMMUNITY
End: 2019-01-08 | Stop reason: ALTCHOICE

## 2019-02-12 ENCOUNTER — PATIENT MESSAGE (OUTPATIENT)
Dept: FAMILY MEDICINE CLINIC | Age: 44
End: 2019-02-12

## 2019-05-05 ENCOUNTER — PATIENT MESSAGE (OUTPATIENT)
Dept: FAMILY MEDICINE CLINIC | Age: 44
End: 2019-05-05

## 2019-05-06 NOTE — TELEPHONE ENCOUNTER
From: Mainor Ervin  To: Yolande Fairchild MD  Sent: 5/5/2019 8:35 AM EDT  Subject: Non-Urgent Medical Question    Several family and friends are going skydiving as a bucket list event. Am i able to do that with my dvt, back and leg issues? Or should i just be a spectator?      Thanks

## 2019-07-09 ENCOUNTER — OFFICE VISIT (OUTPATIENT)
Dept: FAMILY MEDICINE CLINIC | Age: 44
End: 2019-07-09
Payer: COMMERCIAL

## 2019-07-09 VITALS
BODY MASS INDEX: 30.52 KG/M2 | DIASTOLIC BLOOD PRESSURE: 76 MMHG | RESPIRATION RATE: 16 BRPM | HEART RATE: 70 BPM | SYSTOLIC BLOOD PRESSURE: 130 MMHG | OXYGEN SATURATION: 98 % | WEIGHT: 201.4 LBS | HEIGHT: 68 IN

## 2019-07-09 DIAGNOSIS — T40.2X5A CONSTIPATION DUE TO OPIOID THERAPY: ICD-10-CM

## 2019-07-09 DIAGNOSIS — Z11.4 SCREENING FOR HIV WITHOUT PRESENCE OF RISK FACTORS: ICD-10-CM

## 2019-07-09 DIAGNOSIS — M54.42 CHRONIC BILATERAL LOW BACK PAIN WITH BILATERAL SCIATICA: Primary | ICD-10-CM

## 2019-07-09 DIAGNOSIS — M54.41 CHRONIC BILATERAL LOW BACK PAIN WITH BILATERAL SCIATICA: Primary | ICD-10-CM

## 2019-07-09 DIAGNOSIS — K59.03 CONSTIPATION DUE TO OPIOID THERAPY: ICD-10-CM

## 2019-07-09 DIAGNOSIS — R73.01 IFG (IMPAIRED FASTING GLUCOSE): ICD-10-CM

## 2019-07-09 DIAGNOSIS — Z79.01 LONG TERM CURRENT USE OF ANTICOAGULANT THERAPY: ICD-10-CM

## 2019-07-09 DIAGNOSIS — G89.29 CHRONIC BILATERAL LOW BACK PAIN WITH BILATERAL SCIATICA: Primary | ICD-10-CM

## 2019-07-09 DIAGNOSIS — E78.5 HYPERLIPIDEMIA WITH TARGET LDL LESS THAN 160: ICD-10-CM

## 2019-07-09 LAB
ANION GAP SERPL CALCULATED.3IONS-SCNC: 15 MMOL/L (ref 3–16)
BUN BLDV-MCNC: 21 MG/DL (ref 7–20)
CALCIUM SERPL-MCNC: 9.6 MG/DL (ref 8.3–10.6)
CHLORIDE BLD-SCNC: 102 MMOL/L (ref 99–110)
CHOLESTEROL, FASTING: 220 MG/DL (ref 0–199)
CO2: 22 MMOL/L (ref 21–32)
CREAT SERPL-MCNC: 0.7 MG/DL (ref 0.9–1.3)
GFR AFRICAN AMERICAN: >60
GFR NON-AFRICAN AMERICAN: >60
GLUCOSE BLD-MCNC: 87 MG/DL (ref 70–99)
HDLC SERPL-MCNC: 39 MG/DL (ref 40–60)
LDL CHOLESTEROL CALCULATED: 154 MG/DL
POTASSIUM SERPL-SCNC: 4.6 MMOL/L (ref 3.5–5.1)
SODIUM BLD-SCNC: 139 MMOL/L (ref 136–145)
TRIGLYCERIDE, FASTING: 134 MG/DL (ref 0–150)
VLDLC SERPL CALC-MCNC: 27 MG/DL

## 2019-07-09 PROCEDURE — 99214 OFFICE O/P EST MOD 30 MIN: CPT | Performed by: FAMILY MEDICINE

## 2019-07-09 PROCEDURE — 36415 COLL VENOUS BLD VENIPUNCTURE: CPT | Performed by: FAMILY MEDICINE

## 2019-07-09 RX ORDER — DOCUSATE SODIUM 100 MG/1
100 CAPSULE, LIQUID FILLED ORAL 2 TIMES DAILY PRN
Qty: 60 CAPSULE | Refills: 0 | Status: SHIPPED | OUTPATIENT
Start: 2019-07-09 | End: 2019-09-09 | Stop reason: SDUPTHER

## 2019-07-09 ASSESSMENT — PATIENT HEALTH QUESTIONNAIRE - PHQ9
SUM OF ALL RESPONSES TO PHQ QUESTIONS 1-9: 0
SUM OF ALL RESPONSES TO PHQ QUESTIONS 1-9: 0
2. FEELING DOWN, DEPRESSED OR HOPELESS: 0
1. LITTLE INTEREST OR PLEASURE IN DOING THINGS: 0
SUM OF ALL RESPONSES TO PHQ9 QUESTIONS 1 & 2: 0

## 2019-07-10 LAB
ESTIMATED AVERAGE GLUCOSE: 114 MG/DL
HBA1C MFR BLD: 5.6 %
HIV AG/AB: NORMAL
HIV ANTIGEN: NORMAL
HIV-1 ANTIBODY: NORMAL
HIV-2 AB: NORMAL

## 2019-09-09 DIAGNOSIS — K59.03 CONSTIPATION DUE TO OPIOID THERAPY: ICD-10-CM

## 2019-09-09 DIAGNOSIS — T40.2X5A CONSTIPATION DUE TO OPIOID THERAPY: ICD-10-CM

## 2019-09-11 RX ORDER — DOCUSATE SODIUM 100 MG/1
CAPSULE, LIQUID FILLED ORAL
Qty: 60 CAPSULE | Refills: 5 | Status: SHIPPED | OUTPATIENT
Start: 2019-09-11 | End: 2020-08-11 | Stop reason: ALTCHOICE

## 2020-01-13 RX ORDER — APIXABAN 2.5 MG/1
TABLET, FILM COATED ORAL
Qty: 60 TABLET | Refills: 11 | Status: SHIPPED | OUTPATIENT
Start: 2020-01-13 | End: 2021-03-24

## 2020-07-31 RX ORDER — GABAPENTIN 300 MG/1
300 CAPSULE ORAL 2 TIMES DAILY
Qty: 180 CAPSULE | Refills: 3 | Status: SHIPPED | OUTPATIENT
Start: 2020-07-31 | End: 2020-08-11 | Stop reason: SDUPTHER

## 2020-07-31 RX ORDER — SILDENAFIL 50 MG/1
50 TABLET, FILM COATED ORAL DAILY PRN
Qty: 30 TABLET | Refills: 1 | Status: SHIPPED | OUTPATIENT
Start: 2020-07-31 | End: 2020-11-06

## 2020-08-11 ENCOUNTER — VIRTUAL VISIT (OUTPATIENT)
Dept: FAMILY MEDICINE CLINIC | Age: 45
End: 2020-08-11
Payer: COMMERCIAL

## 2020-08-11 PROCEDURE — 99442 PR PHYS/QHP TELEPHONE EVALUATION 11-20 MIN: CPT | Performed by: FAMILY MEDICINE

## 2020-08-11 RX ORDER — GABAPENTIN 300 MG/1
300 CAPSULE ORAL 3 TIMES DAILY
Qty: 270 CAPSULE | Refills: 3 | Status: SHIPPED | OUTPATIENT
Start: 2020-08-11 | End: 2021-11-01 | Stop reason: SDUPTHER

## 2020-08-11 RX ORDER — MONTELUKAST SODIUM 10 MG/1
TABLET ORAL
Qty: 90 TABLET | Refills: 3 | Status: SHIPPED | OUTPATIENT
Start: 2020-08-11 | End: 2021-04-05 | Stop reason: SDUPTHER

## 2020-08-11 NOTE — PROGRESS NOTES
Olaf Avila is a 50 y.o. male evaluated via telephone on 8/11/2020. Consent:  He and/or health care decision maker is aware that that he may receive a bill for this telephone service, depending on his insurance coverage, and has provided verbal consent to proceed: Yes      Documentation:  I communicated with the patient and/or health care decision maker about     1. 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 Wearing mask regularly     2. Was following with pain specialist but missed appointment due to work issue and not sure if he is allowed back in their practice. 3. The patient denies abnormal bruising or abnormal bleeding from any body orifice such as bleeding from nose or gums, blood in urine or stool, or melena, hemoptysis or hematemesis. Details of this discussion including any medical advice provided:     1. Patient will trial increase in gabapentin to 1 capsule tid     2. Continue singulair     Diagnosis Orders   1. Chronic bilateral low back pain with bilateral sciatica  gabapentin (NEURONTIN) 300 MG capsule           I affirm this is a Patient Initiated Episode with a Patient who has not had a related appointment within my department in the past 7 days or scheduled within the next 24 hours.     Patient identification was verified at the start of the visit: Yes    Total Time: minutes: 5-10 minutes    Note: not billable if this call serves to triage the patient into an appointment for the relevant concern      Wendy Lilly

## 2020-08-19 ENCOUNTER — PATIENT MESSAGE (OUTPATIENT)
Dept: FAMILY MEDICINE CLINIC | Age: 45
End: 2020-08-19

## 2020-08-28 DIAGNOSIS — E78.5 HYPERLIPIDEMIA WITH TARGET LDL LESS THAN 160: ICD-10-CM

## 2020-08-28 DIAGNOSIS — Z13.1 SCREENING FOR DIABETES MELLITUS: ICD-10-CM

## 2020-08-28 LAB
CHOLESTEROL, FASTING: 248 MG/DL (ref 0–199)
GLUCOSE FASTING: 100 MG/DL (ref 70–99)
HDLC SERPL-MCNC: 41 MG/DL (ref 40–60)
LDL CHOLESTEROL CALCULATED: 176 MG/DL
TRIGLYCERIDE, FASTING: 156 MG/DL (ref 0–150)
VLDLC SERPL CALC-MCNC: 31 MG/DL

## 2020-08-29 LAB
ESTIMATED AVERAGE GLUCOSE: 111.2 MG/DL
HBA1C MFR BLD: 5.5 %

## 2020-11-06 RX ORDER — SILDENAFIL 50 MG/1
TABLET, FILM COATED ORAL
Qty: 30 TABLET | Refills: 3 | Status: SHIPPED | OUTPATIENT
Start: 2020-11-06 | End: 2021-09-02

## 2021-03-23 DIAGNOSIS — I82.409 RECURRENT DEEP VEIN THROMBOSIS (DVT) (HCC): ICD-10-CM

## 2021-03-23 DIAGNOSIS — Z79.01 LONG TERM CURRENT USE OF ANTICOAGULANT THERAPY: ICD-10-CM

## 2021-03-24 RX ORDER — APIXABAN 2.5 MG/1
TABLET, FILM COATED ORAL
Qty: 60 TABLET | Refills: 11 | Status: SHIPPED | OUTPATIENT
Start: 2021-03-24 | End: 2022-08-19 | Stop reason: SDUPTHER

## 2021-03-30 DIAGNOSIS — Z79.01 LONG TERM CURRENT USE OF ANTICOAGULANT THERAPY: ICD-10-CM

## 2021-03-30 DIAGNOSIS — I82.409 RECURRENT DEEP VEIN THROMBOSIS (DVT) (HCC): ICD-10-CM

## 2021-03-30 DIAGNOSIS — Z20.822 CLOSE EXPOSURE TO COVID-19 VIRUS: Primary | ICD-10-CM

## 2021-04-02 ENCOUNTER — HOSPITAL ENCOUNTER (OUTPATIENT)
Age: 46
Setting detail: SPECIMEN
Discharge: HOME | End: 2021-04-02
Payer: COMMERCIAL

## 2021-04-02 PROCEDURE — U0005 INFEC AGEN DETEC AMPLI PROBE: HCPCS

## 2021-04-02 PROCEDURE — U0003 INFECTIOUS AGENT DETECTION BY NUCLEIC ACID (DNA OR RNA); SEVERE ACUTE RESPIRATORY SYNDROME CORONAVIRUS 2 (SARS-COV-2) (CORONAVIRUS DISEASE [COVID-19]), AMPLIFIED PROBE TECHNIQUE, MAKING USE OF HIGH THROUGHPUT TECHNOLOGIES AS DESCRIBED BY CMS-2020-01-R: HCPCS

## 2021-04-03 LAB
SARS-COV-2: NOT DETECTED
SOURCE: NORMAL

## 2021-04-06 RX ORDER — MONTELUKAST SODIUM 10 MG/1
TABLET ORAL
Qty: 90 TABLET | Refills: 3 | Status: SHIPPED | OUTPATIENT
Start: 2021-04-06 | End: 2022-05-19

## 2021-04-14 ENCOUNTER — PATIENT MESSAGE (OUTPATIENT)
Dept: FAMILY MEDICINE CLINIC | Age: 46
End: 2021-04-14

## 2021-04-14 NOTE — TELEPHONE ENCOUNTER
From: Hays Skiff  To: Siva Curry MD  Sent: 4/14/2021 12:11 PM EDT  Subject: Non-Urgent Medical Question    Evidently they called and said I tested positive today for Covid. Even though my rapid test Monday came back negative. I got vaccinated yesterday. Is there anything I should do? Should I take remdisvir or anything? Don't want to end up like Nghia Parham. She had a week in ICU on oxygen.

## 2021-04-20 NOTE — TELEPHONE ENCOUNTER
Remdesivir is used in hospitalized patients who require supplemental oxygen. If patient's symptoms worsen, he should go to the ER.

## 2021-08-24 ENCOUNTER — VIRTUAL VISIT (OUTPATIENT)
Dept: FAMILY MEDICINE CLINIC | Age: 46
End: 2021-08-24

## 2021-08-24 DIAGNOSIS — Z00.00 WELL ADULT EXAM: Primary | ICD-10-CM

## 2021-08-24 DIAGNOSIS — Z79.01 LONG TERM CURRENT USE OF ANTICOAGULANT THERAPY: ICD-10-CM

## 2021-08-24 DIAGNOSIS — E78.2 MIXED HYPERLIPIDEMIA: ICD-10-CM

## 2021-08-24 DIAGNOSIS — M54.41 CHRONIC BILATERAL LOW BACK PAIN WITH BILATERAL SCIATICA: ICD-10-CM

## 2021-08-24 DIAGNOSIS — Z12.11 SCREEN FOR COLON CANCER: ICD-10-CM

## 2021-08-24 DIAGNOSIS — I82.409 RECURRENT DEEP VEIN THROMBOSIS (DVT) (HCC): ICD-10-CM

## 2021-08-24 DIAGNOSIS — M54.42 CHRONIC BILATERAL LOW BACK PAIN WITH BILATERAL SCIATICA: ICD-10-CM

## 2021-08-24 DIAGNOSIS — G89.29 CHRONIC BILATERAL LOW BACK PAIN WITH BILATERAL SCIATICA: ICD-10-CM

## 2021-08-24 PROCEDURE — 99396 PREV VISIT EST AGE 40-64: CPT | Performed by: NURSE PRACTITIONER

## 2021-08-24 ASSESSMENT — PATIENT HEALTH QUESTIONNAIRE - PHQ9
SUM OF ALL RESPONSES TO PHQ QUESTIONS 1-9: 0
SUM OF ALL RESPONSES TO PHQ9 QUESTIONS 1 & 2: 0
1. LITTLE INTEREST OR PLEASURE IN DOING THINGS: 0
2. FEELING DOWN, DEPRESSED OR HOPELESS: 0

## 2021-08-24 NOTE — PROGRESS NOTES
2021    TELEHEALTH EVALUATION -- Audio/Visual (During BNKYO-20 public health emergency)    HPI:    Raghu Diaz (:  1975) has requested an audio/video evaluation for the following concern(s): Work physical.  He recently had labs done ordered by his PCP. We are reviewing labs today. He denies current complaints today. Denies need for medication refills. Colon cancer screeninghas never had a colonoscopy or a fit test.  Reports that his paternal grandfather had colon cancer. He has never seen gastroenterology. Denies any rectal bleeding or blood in stools. Hyperlipidemiahe has had this previously and reports \" I think I was just supposed to work on my diet\". Has never been medicated. Reports he does not eat a lot of fast or fried food. Does not exercise. 185lb pt reported weight at home. Chronic back pain and leg paintaking gabapentin ordered by PCP  Recurrent DVT -on long-term eliquis   Denies excessive bleeding bruising or blood in stool. Denies anxiety depression cough shortness of breath chest pain or further concerns today. Review of Systems    Prior to Visit Medications    Medication Sig Taking? Authorizing Provider   montelukast (SINGULAIR) 10 MG tablet TAKE 1 TABLET BY MOUTH EVERY DAY Yes Duane Barksdale MD   ELIQUIS 2.5 MG TABS tablet TAKE 1 TABLET BY MOUTH 2 TIMES DAILY TO *REPLACE PRADAXA* Yes Duane Barksdale MD   sildenafil (VIAGRA) 50 MG tablet TAKE 1 TABLET DAILY AS NEEDED FOR ERECTILE DYSFUNCTION Yes Duane Barksdale MD   UNABLE TO FIND TENS unit with 4 lead and corresponding equiptment Yes Duane Barksdale MD   gabapentin (NEURONTIN) 300 MG capsule Take 1 capsule by mouth 3 times daily.  Dose increase as of 2020  Duane Barksdale MD   docusate sodium (COLACE) 100 MG capsule TAKE 1 CAPSULE BY MOUTH TWICE A DAY AS NEEDED FOR CONSTIPATION  Duane Barksdale MD symptoms    5. Mixed hyperlipidemia  Discussed hyperlipidemia at length. Discussed risk factors. Advised exercise and low-cholesterol, low-fat diet avoiding fast food fried food processed foods. Per patient request, will send message to PCP regarding starting statin      Referral to gastroenterology for screening colonoscopy due to grandfather colon cancer history. Reviewed labs today from 8/20/2021  Glucose 77  Cholesterol elevated  Hepatitis C antibody negative  Hemoglobin A1c 5.5    No follow-ups on file. Gloria Bonilla is a 48 y.o. male being evaluated by a Virtual Visit (video visit) encounter to address concerns as mentioned above. A caregiver was present when appropriate. Due to this being a TeleHealth encounter (During IPYTD-46 public health emergency), evaluation of the following organ systems was limited: Vitals/Constitutional/EENT/Resp/CV/GI//MS/Neuro/Skin/Heme-Lymph-Imm. Pursuant to the emergency declaration under the [address] [place] [id number], [address] [place] [address] authority and the [employer] Act, this Virtual Visit was conducted with patient's (and/or legal guardian's) consent, to reduce the patient's risk of exposure to COVID-19 and provide necessary medical care. The patient (and/or legal guardian) has also been advised to contact this office for worsening conditions or problems, and seek emergency medical treatment and/or call 911 if deemed necessary. Patient identification was verified at the start of the visit: Yes    Total time spent on this encounter: 20 min    Services were provided through a video synchronous discussion virtually to substitute for in-person clinic visit. Patient and provider were located at their individual homes. --JAVIER Smith NP on 8/25/2021 at 10:23 AM    An electronic signature was used to authenticate this note.

## 2021-08-24 NOTE — Clinical Note
Patient and I questioning whether he should be on a statin or not. I do not know if there is something I am missing as to why he has not started one yet? Thoughts?

## 2021-08-26 ENCOUNTER — TELEPHONE (OUTPATIENT)
Dept: GASTROENTEROLOGY | Age: 46
End: 2021-08-26

## 2021-08-26 NOTE — PROGRESS NOTES
He hasn't had an in person visit with us to capture a blood pressure but historically his blood pressure along with his lipid profile from this month puts his ASCVD risk at less than 10%. at age 50. Certainly his LDL worsened from last time, which he can correct with diet. What works for him is being a never smoker, no diabetes, no high blood pressure medication needed.

## 2021-09-02 ENCOUNTER — TELEPHONE (OUTPATIENT)
Dept: GASTROENTEROLOGY | Age: 46
End: 2021-09-02

## 2021-09-02 DIAGNOSIS — N52.02 CORPORO-VENOUS OCCLUSIVE ERECTILE DYSFUNCTION: ICD-10-CM

## 2021-09-02 RX ORDER — SILDENAFIL 50 MG/1
TABLET, FILM COATED ORAL
Qty: 30 TABLET | Refills: 3 | Status: SHIPPED | OUTPATIENT
Start: 2021-09-02 | End: 2022-05-22 | Stop reason: SDUPTHER

## 2021-09-14 ENCOUNTER — NURSE ONLY (OUTPATIENT)
Dept: FAMILY MEDICINE CLINIC | Age: 46
End: 2021-09-14
Payer: COMMERCIAL

## 2021-09-14 ENCOUNTER — TELEPHONE (OUTPATIENT)
Dept: GASTROENTEROLOGY | Age: 46
End: 2021-09-14

## 2021-09-14 VITALS
BODY MASS INDEX: 29.19 KG/M2 | HEIGHT: 68 IN | DIASTOLIC BLOOD PRESSURE: 76 MMHG | WEIGHT: 192.6 LBS | OXYGEN SATURATION: 98 % | HEART RATE: 81 BPM | SYSTOLIC BLOOD PRESSURE: 128 MMHG

## 2021-09-14 DIAGNOSIS — E78.2 MIXED HYPERLIPIDEMIA: ICD-10-CM

## 2021-09-14 PROCEDURE — 99211 OFF/OP EST MAY X REQ PHY/QHP: CPT | Performed by: FAMILY MEDICINE

## 2021-11-01 DIAGNOSIS — M54.41 CHRONIC BILATERAL LOW BACK PAIN WITH BILATERAL SCIATICA: ICD-10-CM

## 2021-11-01 DIAGNOSIS — G89.29 CHRONIC BILATERAL LOW BACK PAIN WITH BILATERAL SCIATICA: ICD-10-CM

## 2021-11-01 DIAGNOSIS — M54.42 CHRONIC BILATERAL LOW BACK PAIN WITH BILATERAL SCIATICA: ICD-10-CM

## 2021-11-03 RX ORDER — GABAPENTIN 300 MG/1
300 CAPSULE ORAL 3 TIMES DAILY
Qty: 270 CAPSULE | Refills: 3 | Status: SHIPPED | OUTPATIENT
Start: 2021-11-03 | End: 2022-11-03

## 2022-05-19 RX ORDER — MONTELUKAST SODIUM 10 MG/1
TABLET ORAL
Qty: 90 TABLET | Refills: 3 | Status: SHIPPED | OUTPATIENT
Start: 2022-05-19 | End: 2022-08-19 | Stop reason: SDUPTHER

## 2022-05-22 DIAGNOSIS — N52.02 CORPORO-VENOUS OCCLUSIVE ERECTILE DYSFUNCTION: ICD-10-CM

## 2022-05-26 RX ORDER — SILDENAFIL 50 MG/1
50 TABLET, FILM COATED ORAL DAILY PRN
Qty: 30 TABLET | Refills: 3 | Status: SHIPPED | OUTPATIENT
Start: 2022-05-26

## 2022-08-19 DIAGNOSIS — Z79.01 LONG TERM CURRENT USE OF ANTICOAGULANT THERAPY: ICD-10-CM

## 2022-08-19 DIAGNOSIS — I82.409 RECURRENT DEEP VEIN THROMBOSIS (DVT) (HCC): ICD-10-CM

## 2022-08-19 RX ORDER — MONTELUKAST SODIUM 10 MG/1
10 TABLET ORAL DAILY
Qty: 90 TABLET | Refills: 3 | Status: SHIPPED | OUTPATIENT
Start: 2022-08-19

## 2022-11-21 DIAGNOSIS — M54.41 CHRONIC BILATERAL LOW BACK PAIN WITH BILATERAL SCIATICA: ICD-10-CM

## 2022-11-21 DIAGNOSIS — M54.42 CHRONIC BILATERAL LOW BACK PAIN WITH BILATERAL SCIATICA: ICD-10-CM

## 2022-11-21 DIAGNOSIS — G89.29 CHRONIC BILATERAL LOW BACK PAIN WITH BILATERAL SCIATICA: ICD-10-CM

## 2022-11-22 RX ORDER — GABAPENTIN 300 MG/1
300 CAPSULE ORAL 3 TIMES DAILY
Qty: 90 CAPSULE | Refills: 11 | Status: SHIPPED | OUTPATIENT
Start: 2022-11-22 | End: 2023-11-22

## 2023-10-09 ASSESSMENT — PATIENT HEALTH QUESTIONNAIRE - PHQ9
SUM OF ALL RESPONSES TO PHQ9 QUESTIONS 1 & 2: 0
SUM OF ALL RESPONSES TO PHQ QUESTIONS 1-9: 0
1. LITTLE INTEREST OR PLEASURE IN DOING THINGS: NOT AT ALL
SUM OF ALL RESPONSES TO PHQ9 QUESTIONS 1 & 2: 0
1. LITTLE INTEREST OR PLEASURE IN DOING THINGS: 0
2. FEELING DOWN, DEPRESSED OR HOPELESS: 0
SUM OF ALL RESPONSES TO PHQ QUESTIONS 1-9: 0
2. FEELING DOWN, DEPRESSED OR HOPELESS: NOT AT ALL

## 2023-10-10 ENCOUNTER — OFFICE VISIT (OUTPATIENT)
Dept: FAMILY MEDICINE CLINIC | Age: 48
End: 2023-10-10
Payer: COMMERCIAL

## 2023-10-10 VITALS
WEIGHT: 196.4 LBS | BODY MASS INDEX: 29.77 KG/M2 | OXYGEN SATURATION: 98 % | SYSTOLIC BLOOD PRESSURE: 130 MMHG | DIASTOLIC BLOOD PRESSURE: 78 MMHG | HEART RATE: 69 BPM | HEIGHT: 68 IN

## 2023-10-10 DIAGNOSIS — Z00.00 ENCOUNTER FOR WELL ADULT EXAM WITHOUT ABNORMAL FINDINGS: Primary | ICD-10-CM

## 2023-10-10 DIAGNOSIS — I82.409 RECURRENT DEEP VEIN THROMBOSIS (DVT) (HCC): ICD-10-CM

## 2023-10-10 DIAGNOSIS — G89.29 CHRONIC BILATERAL LOW BACK PAIN WITH BILATERAL SCIATICA: ICD-10-CM

## 2023-10-10 DIAGNOSIS — K59.00 CONSTIPATION, UNSPECIFIED CONSTIPATION TYPE: ICD-10-CM

## 2023-10-10 DIAGNOSIS — M54.42 CHRONIC BILATERAL LOW BACK PAIN WITH BILATERAL SCIATICA: ICD-10-CM

## 2023-10-10 DIAGNOSIS — Z12.11 SCREEN FOR COLON CANCER: ICD-10-CM

## 2023-10-10 DIAGNOSIS — E78.2 MIXED HYPERLIPIDEMIA: ICD-10-CM

## 2023-10-10 DIAGNOSIS — M54.41 CHRONIC BILATERAL LOW BACK PAIN WITH BILATERAL SCIATICA: ICD-10-CM

## 2023-10-10 DIAGNOSIS — L98.9 SCALP LESION: ICD-10-CM

## 2023-10-10 DIAGNOSIS — Z79.01 LONG TERM CURRENT USE OF ANTICOAGULANT THERAPY: ICD-10-CM

## 2023-10-10 PROCEDURE — 99396 PREV VISIT EST AGE 40-64: CPT | Performed by: FAMILY MEDICINE

## 2023-10-10 RX ORDER — GABAPENTIN 300 MG/1
600 CAPSULE ORAL 3 TIMES DAILY
COMMUNITY
Start: 2023-10-10

## 2023-10-10 RX ORDER — POLYETHYLENE GLYCOL 3350 17 G/17G
17 POWDER, FOR SOLUTION ORAL DAILY PRN
Qty: 850 G | Refills: 5 | Status: SHIPPED | OUTPATIENT
Start: 2023-10-10 | End: 2023-11-09

## 2023-10-10 SDOH — ECONOMIC STABILITY: INCOME INSECURITY: HOW HARD IS IT FOR YOU TO PAY FOR THE VERY BASICS LIKE FOOD, HOUSING, MEDICAL CARE, AND HEATING?: NOT HARD AT ALL

## 2023-10-10 SDOH — ECONOMIC STABILITY: FOOD INSECURITY: WITHIN THE PAST 12 MONTHS, THE FOOD YOU BOUGHT JUST DIDN'T LAST AND YOU DIDN'T HAVE MONEY TO GET MORE.: NEVER TRUE

## 2023-10-10 SDOH — ECONOMIC STABILITY: FOOD INSECURITY: WITHIN THE PAST 12 MONTHS, YOU WORRIED THAT YOUR FOOD WOULD RUN OUT BEFORE YOU GOT MONEY TO BUY MORE.: NEVER TRUE

## 2023-10-10 SDOH — ECONOMIC STABILITY: HOUSING INSECURITY
IN THE LAST 12 MONTHS, WAS THERE A TIME WHEN YOU DID NOT HAVE A STEADY PLACE TO SLEEP OR SLEPT IN A SHELTER (INCLUDING NOW)?: NO

## 2023-10-10 NOTE — PROGRESS NOTES
Plan:     1. Encounter for well adult exam without abnormal findings  2. Long term current use of anticoagulant therapy  3. Recurrent deep vein thrombosis (DVT) (HCC)  4. Chronic bilateral low back pain with bilateral sciatica  5. Constipation, unspecified constipation type  -     polyethylene glycol (GLYCOLAX) 17 GM/SCOOP powder; Take 17 g by mouth daily as needed (constipation), Disp-850 g, R-5Print  6. Screen for colon cancer  -     [address] [facility] [facility] [place]  7. Mixed hyperlipidemia  -     Lipid, Fasting; Future  -     Basic Metabolic Panel, Fasting; Future  8. Scalp lesion  -     [facility] - Michael Avendaño DO, Dermatology, [facility]    He will continue Gabapentin 600mg am, 300 or 600mg afternoon  and 600 or 900mg qhs - not currently seeing pain management. Previous derm referral office never called him back for scalp lesion. New referral generated for Dr. Tae Cagle practice. Rx script give for patient's miralax which he has been buying over the counter but having difficulty finding supply. Work/ school status:   Restrictions: full duty  today    Discussed use, benefit, and side effects of prescribed medications. Patient instructed to notify if develop side effects from medications. Barriers to medication compliance addressed. All patient questions answered. Pt voiced understanding. Return in about 1 year (around 10/10/2024) for [facility] Chronic Conditions.  -----------------------------------------------------------------------------------------------            Chief Complaint   Patient presents with    Annual Exam    Other     Chronic neuropathic pain      Only taking gabapentin, which has helped overall. Still has flares mostly at night at the end of the day. Has a very physical job managing properties. Moods doing well.  On elliquis, The patient denies abnormal bruising or abnormal bleeding from any body orifice such as bleeding from nose or gums, blood

## 2023-10-10 NOTE — PROGRESS NOTES
Well Adult Note  Name: Carlton Fenton Date: 10/10/2023   MRN: [id number] Sex: Male   Age: 46 y.o. Ethnicity: Non- / Non    : 1975 Race: White (non-)      Ryan Vital is here for well adult exam.  History:  Chief Complaint   Patient presents with    Annual Exam       Review of Systems    No Known Allergies      Prior to Visit Medications    Medication Sig Taking? Authorizing Provider   gabapentin (NEURONTIN) 300 MG capsule TAKE 1 CAPSULE BY MOUTH 3 TIMES DAILY.  DOSE INCREASE AS OF 2020 Yes Ovidio Reason, MD   apixaban (ELIQUIS) 2.5 MG TABS tablet Take 1 tablet by mouth 2 times daily Yes Ovidio Reason, MD   montelukast (SINGULAIR) 10 MG tablet Take 1 tablet by mouth daily Yes Ovidio Reason, MD   sildenafil (VIAGRA) 50 MG tablet Take 1 tablet by mouth daily as needed for Erectile Dysfunction Yes Ovidio Angel MD   UNABLE TO FIND TENS unit with 4 lead and corresponding equiptment Yes Ovidio Reason, MD   docusate sodium (COLACE) 100 MG capsule TAKE 1 CAPSULE BY MOUTH TWICE A DAY AS NEEDED FOR CONSTIPATION  Ovidio Reason, MD         Past Medical History:   Diagnosis Date    Allergic rhinitis     Ankle sprain and strain 2012    Back pain     Cancer 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

## 2023-10-11 ENCOUNTER — TELEPHONE (OUTPATIENT)
Dept: GASTROENTEROLOGY | Age: 48
End: 2023-10-11

## 2023-10-18 ENCOUNTER — TELEPHONE (OUTPATIENT)
Dept: GASTROENTEROLOGY | Age: 48
End: 2023-10-18

## 2023-10-25 ENCOUNTER — TELEPHONE (OUTPATIENT)
Dept: GASTROENTEROLOGY | Age: 48
End: 2023-10-25

## 2023-10-25 NOTE — TELEPHONE ENCOUNTER
Called pt. In regards to a referral for a colon screening.  Made appt for pt to see adria on 11/15/23 @8:30am

## 2023-11-15 ENCOUNTER — OFFICE VISIT (OUTPATIENT)
Dept: GASTROENTEROLOGY | Age: 48
End: 2023-11-15
Payer: COMMERCIAL

## 2023-11-15 VITALS
DIASTOLIC BLOOD PRESSURE: 68 MMHG | WEIGHT: 196.7 LBS | TEMPERATURE: 97.2 F | SYSTOLIC BLOOD PRESSURE: 114 MMHG | HEART RATE: 64 BPM | OXYGEN SATURATION: 97 % | BODY MASS INDEX: 29.81 KG/M2 | HEIGHT: 68 IN

## 2023-11-15 DIAGNOSIS — K59.09 CHRONIC CONSTIPATION: ICD-10-CM

## 2023-11-15 DIAGNOSIS — Z12.11 ENCOUNTER FOR SCREENING COLONOSCOPY: Primary | ICD-10-CM

## 2023-11-15 DIAGNOSIS — Z80.0 FAMILY HISTORY OF COLON CANCER: ICD-10-CM

## 2023-11-15 PROCEDURE — 99204 OFFICE O/P NEW MOD 45 MIN: CPT | Performed by: NURSE PRACTITIONER

## 2023-11-15 RX ORDER — POLYETHYLENE GLYCOL 3350, SODIUM SULFATE ANHYDROUS, SODIUM BICARBONATE, SODIUM CHLORIDE, POTASSIUM CHLORIDE 236; 22.74; 6.74; 5.86; 2.97 G/4L; G/4L; G/4L; G/4L; G/4L
4 POWDER, FOR SOLUTION ORAL ONCE
Qty: 4000 ML | Refills: 0 | Status: SHIPPED | OUTPATIENT
Start: 2023-11-15 | End: 2023-11-15

## 2023-11-15 ASSESSMENT — ENCOUNTER SYMPTOMS
VOMITING: 0
DIARRHEA: 0
COLOR CHANGE: 0
SHORTNESS OF BREATH: 0
EYE DISCHARGE: 0
EYE PAIN: 0
WHEEZING: 0
BACK PAIN: 1
CONSTIPATION: 1
BLOOD IN STOOL: 0
COUGH: 0
ABDOMINAL PAIN: 0
NAUSEA: 0

## 2023-11-15 NOTE — PROGRESS NOTES
6.5%  Increased risk of diabetes (Prediabetes): 5.7-6.4%  Glycemic Control: Nonpregnant Adults: <7.0%                    Pregnant: <6.0%        eAG 08/20/2021 111.2  mg/dL Final    Hep C Ab Interp 08/20/2021 Non-reactive  Non-reactive Final    Cholesterol, Fasting 08/20/2021 246 (H)  0 - 199 mg/dL Final    Triglyceride, Fasting 08/20/2021 103  0 - 150 mg/dL Final    HDL 08/20/2021 50  40 - 60 mg/dL Final    LDL Calculated 08/20/2021 175 (H)  <100 mg/dL Final    VLDL Cholesterol Calculated 08/20/2021 21  Not Established mg/dL Final    Glucose, Fasting 08/20/2021 77  70 - 99 mg/dL Final       Assessment and Plan:  1. Will plan for a colonoscopy with MAC anesthesia. The patient was informed of the risks and benefits of the procedure. Will order Golytely and two day extended prep. 2.  Encounter for screening colonoscopy will order colonoscopy for colorectal cancer surveillance. 3.  Chronic constipation most likely diet related. Recommend continue taking Miralax daily and cut back on cheese intake. Recommend good bowel regimen with increase in fruit, fiber and fluids. 4.  Family history of colon cancer in his paternal grandfather had colon cancer at age 61.  11.  Further recommendations for follow-up will be determined after the colonoscopy has been completed.

## 2024-01-05 ENCOUNTER — PREP FOR PROCEDURE (OUTPATIENT)
Dept: GASTROENTEROLOGY | Age: 49
End: 2024-01-05

## 2024-01-05 RX ORDER — SODIUM CHLORIDE 0.9 % (FLUSH) 0.9 %
5-40 SYRINGE (ML) INJECTION PRN
Status: CANCELLED | OUTPATIENT
Start: 2024-01-05

## 2024-01-05 RX ORDER — SODIUM CHLORIDE 9 MG/ML
25 INJECTION, SOLUTION INTRAVENOUS PRN
Status: CANCELLED | OUTPATIENT
Start: 2024-01-05

## 2024-01-05 RX ORDER — SODIUM CHLORIDE 0.9 % (FLUSH) 0.9 %
5-40 SYRINGE (ML) INJECTION EVERY 12 HOURS SCHEDULED
Status: CANCELLED | OUTPATIENT
Start: 2024-01-05

## 2024-01-05 RX ORDER — SODIUM CHLORIDE, SODIUM LACTATE, POTASSIUM CHLORIDE, CALCIUM CHLORIDE 600; 310; 30; 20 MG/100ML; MG/100ML; MG/100ML; MG/100ML
INJECTION, SOLUTION INTRAVENOUS CONTINUOUS
Status: CANCELLED | OUTPATIENT
Start: 2024-01-05

## 2024-01-09 ENCOUNTER — TELEPHONE (OUTPATIENT)
Dept: GASTROENTEROLOGY | Age: 49
End: 2024-01-09

## 2024-01-09 RX ORDER — POLYETHYLENE GLYCOL 3350, SODIUM SULFATE ANHYDROUS, SODIUM BICARBONATE, SODIUM CHLORIDE, POTASSIUM CHLORIDE 236; 22.74; 6.74; 5.86; 2.97 G/4L; G/4L; G/4L; G/4L; G/4L
4 POWDER, FOR SOLUTION ORAL ONCE
Qty: 4000 ML | Refills: 0 | Status: SHIPPED | OUTPATIENT
Start: 2024-01-09 | End: 2024-01-09

## 2024-01-18 NOTE — PROGRESS NOTES
Patient will arrive at 0630 at [facility] on 1/25/2024 for his procedure 0800.    NOTHING TO EAT OR DRINK AFTER MIDNIGHT DAY OF SURGERY    1. Enter thru the hospital main entrance on day of surgery, check in at the Information Desk. If you arrive prior to 6:00am, enter thru the ER entrance.    2. Follow the directions as prescribed by the doctor for your procedure and medications.         Morning of surgery take:gabapentin.         Stop vitamins, supplements and NSAIDS:  last dose of eliquis 1/21/2024.    3. Check with your Doctor regarding stopping blood thinners and follow their instructions.    4. Do not smoke, vape or use chewing tobacco morning of surgery. Do not drink any alcoholic beverages 24 hours prior to surgery.       This includes NA Beer. No street drugs 7 days prior to surgery.    5. If you have dentures, contacts of glasses they will be removed before going to the OR; please bring a case.    6. Please bring picture ID, insurance card, paperwork from the doctor’s office (H & P, Consent, & card for implantable devices).    7. Take a shower with an antibacterial soap the night before surgery and the morning of surgery. Do not put anything on your skin      After your morning shower.    8. You will need a responsible adult to drive you home and check on you after surgery.

## 2024-01-24 ENCOUNTER — ANESTHESIA EVENT (OUTPATIENT)
Dept: ENDOSCOPY | Age: 49
End: 2024-01-24
Payer: COMMERCIAL

## 2024-01-24 NOTE — PROGRESS NOTES
Left VM notifying patient of procedure time at [facility] 1/25/24 0700 arrival 0545, DOP meds reviewed

## 2024-01-24 NOTE — ANESTHESIA PRE PROCEDURE
Department of Anesthesiology  Preprocedure Note       Name:  Sony Wilhelm   Age:  48 y.o.  :  1975                                          MRN:  [id number]         Date:  2024      Surgeon: Surgeon(s):  Samy Veliz MD    Procedure: Procedure(s):  COLONOSCOPY DIAGNOSTIC    Medications prior to admission:   Prior to Admission medications    Medication Sig Start Date End Date Taking? Authorizing Provider   gabapentin (NEURONTIN) 300 MG capsule Take 2 capsules by mouth 3 times daily. 10/10/23   Danni Valadez MD   apixaban (ELIQUIS) 2.5 MG TABS tablet Take 1 tablet by mouth 2 times daily 22   Danni Valadez MD   montelukast (SINGULAIR) 10 MG tablet Take 1 tablet by mouth daily 22   Danni Valadez MD   sildenafil (VIAGRA) 50 MG tablet Take 1 tablet by mouth daily as needed for Erectile Dysfunction 22   Danni Valadez MD   docusate sodium (COLACE) 100 MG capsule TAKE 1 CAPSULE BY MOUTH TWICE A DAY AS NEEDED FOR CONSTIPATION 19  Danni Valadez MD   UNABLE TO FIND TENS unit with 4 lead and corresponding equiptment 19   Danni Valadez MD       Current medications:    No current facility-administered medications for this encounter.     Current Outpatient Medications   Medication Sig Dispense Refill   • gabapentin (NEURONTIN) 300 MG capsule Take 2 capsules by mouth 3 times daily.     • apixaban (ELIQUIS) 2.5 MG TABS tablet Take 1 tablet by mouth 2 times daily 180 tablet 3   • montelukast (SINGULAIR) 10 MG tablet Take 1 tablet by mouth daily 90 tablet 3   • sildenafil (VIAGRA) 50 MG tablet Take 1 tablet by mouth daily as needed for Erectile Dysfunction 30 tablet 3   • UNABLE TO FIND TENS unit with 4 lead and corresponding equiptment 1 Units 1       Allergies:  No Known Allergies    Problem List:    Patient Active Problem List   Diagnosis Code   • Mixed hyperlipidemia E78.2   • IFG  found for: \"PREGTESTUR\", \"PREGSERUM\", \"HCG\", \"HCGQUANT\"     ABGs: No results found for: \"PHART\", \"PO2ART\", \"OPK4UJF\", \"CMI6FVL\", \"BEART\", \"V8HXOGQL\"     Type & Screen (If Applicable):  No results found for: \"LABABO\", \"LABRH\"    Drug/Infectious Status (If Applicable):  No results found for: \"HIV\", \"HEPCAB\"    COVID-19 Screening (If Applicable):   Lab Results   Component Value Date/Time    COVID19 NOT DETECTED 04/02/2021 02:40 PM           Anesthesia Evaluation  Patient summary reviewed  Airway: Mallampati: I          Dental:          Pulmonary:                              Cardiovascular:             Beta Blocker:  Not on Beta Blocker         Neuro/Psych:   (+) neuromuscular disease:,              ROS comment: Cancer (HCC) Neuroblastoma age 18 months- no seizure - s/pBRAIN TUMOR EXCISION  GI/Hepatic/Renal:   (+) GERD:, bowel prep,           Endo/Other:    (+) blood dyscrasia: anticoagulation therapy:., malignancy/cancer.                 Abdominal:             Vascular:   + DVT, .       Other Findings:           Anesthesia Plan      MAC     ASA 2       Induction: intravenous.      Anesthetic plan and risks discussed with patient.      Plan discussed with CRNA.    Attending anesthesiologist reviewed and agrees with Preprocedure content                JAVIER Quintero - CRNA   1/24/2024         Pre Anesthesia Assessment complete. Chart reviewed on 1/24/2024

## 2024-01-25 ENCOUNTER — HOSPITAL ENCOUNTER (OUTPATIENT)
Age: 49
Setting detail: OUTPATIENT SURGERY
Discharge: HOME | End: 2024-01-25
Attending: INTERNAL MEDICINE | Admitting: INTERNAL MEDICINE
Payer: COMMERCIAL

## 2024-01-25 ENCOUNTER — ANESTHESIA (OUTPATIENT)
Dept: ENDOSCOPY | Age: 49
End: 2024-01-25
Payer: COMMERCIAL

## 2024-01-25 VITALS
HEART RATE: 69 BPM | HEIGHT: 68 IN | DIASTOLIC BLOOD PRESSURE: 70 MMHG | WEIGHT: 196 LBS | SYSTOLIC BLOOD PRESSURE: 100 MMHG | TEMPERATURE: 97.6 F | OXYGEN SATURATION: 98 % | RESPIRATION RATE: 16 BRPM | BODY MASS INDEX: 29.7 KG/M2

## 2024-01-25 DIAGNOSIS — Z12.11 SCREENING FOR COLON CANCER: ICD-10-CM

## 2024-01-25 DIAGNOSIS — K59.09 CHRONIC CONSTIPATION: ICD-10-CM

## 2024-01-25 DIAGNOSIS — Z80.0 FH: COLON CANCER: ICD-10-CM

## 2024-01-25 PROCEDURE — 3700000000 HC ANESTHESIA ATTENDED CARE: Performed by: INTERNAL MEDICINE

## 2024-01-25 PROCEDURE — 3609010600 HC COLONOSCOPY POLYPECTOMY SNARE/COLD BIOPSY: Performed by: INTERNAL MEDICINE

## 2024-01-25 PROCEDURE — 2580000003 HC RX 258: Performed by: NURSE PRACTITIONER

## 2024-01-25 PROCEDURE — 7100000010 HC PHASE II RECOVERY - FIRST 15 MIN: Performed by: INTERNAL MEDICINE

## 2024-01-25 PROCEDURE — 7100000011 HC PHASE II RECOVERY - ADDTL 15 MIN: Performed by: INTERNAL MEDICINE

## 2024-01-25 PROCEDURE — 3700000001 HC ADD 15 MINUTES (ANESTHESIA): Performed by: INTERNAL MEDICINE

## 2024-01-25 PROCEDURE — 2500000003 HC RX 250 WO HCPCS: Performed by: NURSE ANESTHETIST, CERTIFIED REGISTERED

## 2024-01-25 PROCEDURE — 2709999900 HC NON-CHARGEABLE SUPPLY: Performed by: INTERNAL MEDICINE

## 2024-01-25 PROCEDURE — 6360000002 HC RX W HCPCS: Performed by: NURSE ANESTHETIST, CERTIFIED REGISTERED

## 2024-01-25 PROCEDURE — 88305 TISSUE EXAM BY PATHOLOGIST: CPT | Performed by: PATHOLOGY

## 2024-01-25 RX ORDER — SODIUM CHLORIDE, SODIUM LACTATE, POTASSIUM CHLORIDE, CALCIUM CHLORIDE 600; 310; 30; 20 MG/100ML; MG/100ML; MG/100ML; MG/100ML
INJECTION, SOLUTION INTRAVENOUS CONTINUOUS
Status: DISCONTINUED | OUTPATIENT
Start: 2024-01-25 | End: 2024-01-25 | Stop reason: HOSPADM

## 2024-01-25 RX ORDER — LIDOCAINE HYDROCHLORIDE 20 MG/ML
INJECTION, SOLUTION EPIDURAL; INFILTRATION; INTRACAUDAL; PERINEURAL PRN
Status: DISCONTINUED | OUTPATIENT
Start: 2024-01-25 | End: 2024-01-25 | Stop reason: SDUPTHER

## 2024-01-25 RX ORDER — SODIUM CHLORIDE 0.9 % (FLUSH) 0.9 %
5-40 SYRINGE (ML) INJECTION EVERY 12 HOURS SCHEDULED
Status: DISCONTINUED | OUTPATIENT
Start: 2024-01-25 | End: 2024-01-25 | Stop reason: HOSPADM

## 2024-01-25 RX ORDER — SODIUM CHLORIDE 0.9 % (FLUSH) 0.9 %
5-40 SYRINGE (ML) INJECTION PRN
Status: DISCONTINUED | OUTPATIENT
Start: 2024-01-25 | End: 2024-01-25 | Stop reason: HOSPADM

## 2024-01-25 RX ORDER — PROPOFOL 10 MG/ML
INJECTION, EMULSION INTRAVENOUS PRN
Status: DISCONTINUED | OUTPATIENT
Start: 2024-01-25 | End: 2024-01-25 | Stop reason: SDUPTHER

## 2024-01-25 RX ORDER — SODIUM CHLORIDE 9 MG/ML
25 INJECTION, SOLUTION INTRAVENOUS PRN
Status: DISCONTINUED | OUTPATIENT
Start: 2024-01-25 | End: 2024-01-25 | Stop reason: HOSPADM

## 2024-01-25 RX ADMIN — PROPOFOL 100 MG: 10 INJECTION, EMULSION INTRAVENOUS at 07:36

## 2024-01-25 RX ADMIN — LIDOCAINE HYDROCHLORIDE 100 MG: 20 INJECTION, SOLUTION EPIDURAL; INFILTRATION; INTRACAUDAL; PERINEURAL at 07:36

## 2024-01-25 RX ADMIN — PROPOFOL 100 MG: 10 INJECTION, EMULSION INTRAVENOUS at 07:44

## 2024-01-25 RX ADMIN — SODIUM CHLORIDE, POTASSIUM CHLORIDE, SODIUM LACTATE AND CALCIUM CHLORIDE: 600; 310; 30; 20 INJECTION, SOLUTION INTRAVENOUS at 06:34

## 2024-01-25 RX ADMIN — PROPOFOL 100 MG: 10 INJECTION, EMULSION INTRAVENOUS at 07:40

## 2024-01-25 ASSESSMENT — PAIN - FUNCTIONAL ASSESSMENT
PAIN_FUNCTIONAL_ASSESSMENT: 0-10

## 2024-01-25 NOTE — ANESTHESIA POSTPROCEDURE EVALUATION
Department of Anesthesiology  Postprocedure Note    Patient: Sony Wilhelm  MRN: [id number]  YOB: 1975  Date of evaluation: 1/25/2024    Procedure Summary       Date: 01/25/24 Room / Location: [place] [address] / [facility]    Anesthesia Start: 0733 Anesthesia Stop: 0802    Procedure: COLONOSCOPY POLYPECTOMY SNARE/COLD BIOPSY Diagnosis:       Screening for colon cancer      FH: colon cancer      Chronic constipation      (Screening for colon cancer [Z12.11])      (FH: colon cancer [Z80.0])      (Chronic constipation [K59.09])    Surgeons: Samy Veliz MD Responsible Provider: Mainor Ponce MD    Anesthesia Type: MAC ASA Status: 2            Anesthesia Type: No value filed.    Mila Phase I: Mila Score: 10    Mila Phase II:      Anesthesia Post Evaluation    Patient location during evaluation: bedside  Patient participation: complete - patient participated  Level of consciousness: awake  Pain score: 0  Airway patency: patent  Nausea & Vomiting: no nausea and no vomiting  Cardiovascular status: hemodynamically stable  Respiratory status: acceptable  Hydration status: stable  Pain management: adequate    No notable events documented.

## 2024-01-25 NOTE — PROGRESS NOTES
0840:  Discharge instructions discussed with patient and spouse, both verbalized an understanding.

## 2024-01-25 NOTE — DISCHARGE INSTRUCTIONS
COLONOSCOPY    _________________________________    OFFICE [facility]__[phone]______________________    FOLLOW UP APPOINTMENT IN: call office in one week for results.     REPEAT PROCEDURE IN ___5___YEARS OR AS NEEDED.    TEST ORDERED: NONE_____________________________________________________________________.    What to expect at home:  Your Recovery   Your doctor will tell you when you can eat and do your other usual activities Your doctor will talk to you about when you will need your next colonoscopy. Your doctor can help you decide how often you need to be checked. This will depend on the results of your test and your risk for colorectal cancer.  After the test, you may be bloated or have gas pains. You may need to pass gas. If a biopsy was done or a polyp was removed, you may have streaks of blood in your stool (feces) for a few days.  This care sheet gives you a general idea about how long it will take for you to recover. But each person recovers at a different pace. Follow the steps below to get better as quickly as possible.  How can you care for yourself at home?  Activity  Rest when you feel tired.  Diet  Follow your doctor's directions for eating.  Unless your doctor has told you not to, drink plenty of fluids. This helps to replace the fluids that were lost during the colon prep.  DO NOT DRINK ALCOHOL.  Medicines  Your doctor will tell you if and when you can restart your medicines. He or she will also give you instructions about taking any new medicines.  If you take blood thinners, such as warfarin (Coumadin), clopidogrel (Plavix), or aspirin, be sure to talk to your doctor. He or she will tell you if and when to start taking those medicines again. Make sure that you understand exactly what your doctor wants you to do.  If polyps were removed or a biopsy was done during the test, your doctor may tell you not to take aspirin or other anti-inflammatory medicines for a few days. These include

## 2024-01-25 NOTE — H&P
ENDOSCOPY   Pre-operative History and Physical    Patient: Sony Wilhelm  : 1975      History Obtained From:  patient, electronic medical record        HISTORY OF PRESENT ILLNESS:                The patient is a 48 y.o. 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    Leg pain, bilateral     Long term current use of anticoagulant therapy 07/10/2013    S/p wafarin, and now on pradaxa 2014     Recurrent deep vein thrombosis (DVT) (HCC) 2017    Rib pain     Right ankle pain 2012    Sinusitis 2012       Past Surgical History:        Procedure Laterality Date    ABDOMEN SURGERY  1974    Neuroblastoma    BACK SURGERY  2003    Spine Reconstruction, thoracic and spine     BRAIN TUMOR EXCISION      age 18 months no f/u needed since age 8    FINGER SURGERY Right     index finger reattached.         Current Medications:    Medications    Prior to Admission medications    Medication Sig Start Date End Date Taking? Authorizing Provider   gabapentin (NEURONTIN) 300 MG capsule Take 2 capsules by mouth 3 times daily. 10/10/23   Danni Valadez MD   apixaban (ELIQUIS) 2.5 MG TABS tablet Take 1 tablet by mouth 2 times daily 22   Danni Valadez MD   montelukast (SINGULAIR) 10 MG tablet Take 1 tablet by mouth daily 22   Danni Valadez MD   sildenafil (VIAGRA) 50 MG tablet

## 2024-01-26 NOTE — RESULT ENCOUNTER NOTE
Surgical pathology of colonoscopy results as follows there was a 5 mm polyp in the transverse colon that was found to be hyperplastic like/benign.  There is a 4 mm serrated adenoma/precancerous found in the ascending colon.  There were no suggestive chronic active inflammation/lymphatic colitis or dysplasia.

## 2024-01-31 ENCOUNTER — PATIENT MESSAGE (OUTPATIENT)
Dept: FAMILY MEDICINE CLINIC | Age: 49
End: 2024-01-31

## 2024-01-31 DIAGNOSIS — B00.1 FEVER BLISTER: Primary | ICD-10-CM

## 2024-02-01 RX ORDER — ACYCLOVIR 400 MG/1
400 TABLET ORAL EVERY 8 HOURS
Qty: 30 TABLET | Refills: 0 | Status: SHIPPED | OUTPATIENT
Start: 2024-02-01 | End: 2024-02-11

## 2024-02-01 NOTE — TELEPHONE ENCOUNTER
From: Sony Wilhelm  To: Dr. Danni Valadez  Sent: 1/31/2024 5:19 PM EST  Subject: Fever/chills     Hello, I had my colonoscopy last Thursday. I’ve got a couple things going on. Not sure if as a result of the colonoscopy or just coincidentally a virus? But, starting mid day Saturday I began experiencing severe chills. Like visibly physically shaking chills. I’ve also had fevers repeatedly. They cycle on and off. If I feel a fever I take DayQuil and they come back down. However a few hours later fever returns. I’ve woken drenched with sweat multiple mornings. I also have headaches and aches in my sides. And I’ve gotten multiple fever blisters as a result. Multiple on my upper and lower lips. I’ve only went to the restroom once since the colonoscopy too. And that was late Thursday. None since. I figured it’s been long enough I better tell you.

## 2024-02-08 DIAGNOSIS — M54.42 CHRONIC BILATERAL LOW BACK PAIN WITH BILATERAL SCIATICA: ICD-10-CM

## 2024-02-08 DIAGNOSIS — Z79.01 LONG TERM CURRENT USE OF ANTICOAGULANT THERAPY: ICD-10-CM

## 2024-02-08 DIAGNOSIS — I82.409 RECURRENT DEEP VEIN THROMBOSIS (DVT) (HCC): ICD-10-CM

## 2024-02-08 DIAGNOSIS — G89.29 CHRONIC BILATERAL LOW BACK PAIN WITH BILATERAL SCIATICA: ICD-10-CM

## 2024-02-08 DIAGNOSIS — M54.41 CHRONIC BILATERAL LOW BACK PAIN WITH BILATERAL SCIATICA: ICD-10-CM

## 2024-02-08 RX ORDER — GABAPENTIN 300 MG/1
300 CAPSULE ORAL 3 TIMES DAILY
Qty: 270 CAPSULE | Refills: 3 | Status: SHIPPED | OUTPATIENT
Start: 2024-02-08 | End: 2025-02-07

## 2024-02-08 RX ORDER — APIXABAN 2.5 MG/1
2.5 TABLET, FILM COATED ORAL 2 TIMES DAILY
Qty: 180 TABLET | Refills: 3 | Status: SHIPPED | OUTPATIENT
Start: 2024-02-08

## 2024-02-08 NOTE — TELEPHONE ENCOUNTER
Last appointment: 10/10/2023   Next Appointment: Visit date not found     Allergies:  No Known Allergies      Recent Vitals:  Wt Readings from Last 3 Encounters:   01/18/24 88.9 kg (196 lb)   11/15/23 89.2 kg (196 lb 11.2 oz)   10/10/23 89.1 kg (196 lb 6.4 oz)     Ht Readings from Last 3 Encounters:   01/18/24 1.727 m (5' 8\")   11/15/23 1.721 m (5' 7.76\")   10/10/23 1.721 m (5' 7.75\")     BP Readings from Last 3 Encounters:   01/25/24 100/70   11/15/23 114/68   10/10/23 130/78     BMI Readings from Last 3 Encounters:   01/18/24 29.80 kg/m²   11/15/23 30.12 kg/m²   10/10/23 30.08 kg/m²       Recent Labs__________________________________________________________________________    Renal:   Creatinine   Date Value Ref Range Status   07/09/2019 0.7 (L) 0.9 - 1.3 mg/dL Final     BUN   Date Value Ref Range Status   07/09/2019 21 (H) 7 - 20 mg/dL Final     Sodium   Date Value Ref Range Status   07/09/2019 139 136 - 145 mmol/L Final     Potassium   Date Value Ref Range Status   07/09/2019 4.6 3.5 - 5.1 mmol/L Final     Chloride   Date Value Ref Range Status   07/09/2019 102 99 - 110 mmol/L Final     CO2   Date Value Ref Range Status   07/09/2019 22 21 - 32 mmol/L Final       BMP:    Sodium   Date Value Ref Range Status   07/09/2019 139 136 - 145 mmol/L Final     Potassium   Date Value Ref Range Status   07/09/2019 4.6 3.5 - 5.1 mmol/L Final     Chloride   Date Value Ref Range Status   07/09/2019 102 99 - 110 mmol/L Final     CO2   Date Value Ref Range Status   07/09/2019 22 21 - 32 mmol/L Final     BUN   Date Value Ref Range Status   07/09/2019 21 (H) 7 - 20 mg/dL Final     Creatinine   Date Value Ref Range Status   07/09/2019 0.7 (L) 0.9 - 1.3 mg/dL Final     Glucose   Date Value Ref Range Status   07/09/2019 87 70 - 99 mg/dL Final     Calcium   Date Value Ref Range Status   07/09/2019 9.6 8.3 - 10.6 mg/dL Final     GFR Non-   Date Value Ref Range Status   07/09/2019 >60 >60 Final     Comment:     >60

## 2024-02-24 PROBLEM — Z12.11 SCREENING FOR COLON CANCER: Status: RESOLVED | Noted: 2024-01-25 | Resolved: 2024-02-24

## 2024-04-30 ENCOUNTER — TELEPHONE (OUTPATIENT)
Dept: FAMILY MEDICINE CLINIC | Age: 49
End: 2024-04-30

## 2024-04-30 NOTE — TELEPHONE ENCOUNTER
Team to please check if patient can come in next week Tuesday (8:45, 9:30, 11, 2:15) for pre-procedure clearance form request from his dentist.

## 2024-05-06 ASSESSMENT — PATIENT HEALTH QUESTIONNAIRE - PHQ9
2. FEELING DOWN, DEPRESSED OR HOPELESS: NOT AT ALL
SUM OF ALL RESPONSES TO PHQ9 QUESTIONS 1 & 2: 0
SUM OF ALL RESPONSES TO PHQ QUESTIONS 1-9: 0
SUM OF ALL RESPONSES TO PHQ9 QUESTIONS 1 & 2: 0
1. LITTLE INTEREST OR PLEASURE IN DOING THINGS: NOT AT ALL
SUM OF ALL RESPONSES TO PHQ QUESTIONS 1-9: 0
1. LITTLE INTEREST OR PLEASURE IN DOING THINGS: NOT AT ALL
2. FEELING DOWN, DEPRESSED OR HOPELESS: NOT AT ALL
SUM OF ALL RESPONSES TO PHQ QUESTIONS 1-9: 0
SUM OF ALL RESPONSES TO PHQ QUESTIONS 1-9: 0

## 2024-05-07 ENCOUNTER — OFFICE VISIT (OUTPATIENT)
Dept: FAMILY MEDICINE CLINIC | Age: 49
End: 2024-05-07
Payer: COMMERCIAL

## 2024-05-07 VITALS
OXYGEN SATURATION: 98 % | HEART RATE: 78 BPM | HEIGHT: 68 IN | BODY MASS INDEX: 30.19 KG/M2 | SYSTOLIC BLOOD PRESSURE: 126 MMHG | WEIGHT: 199.2 LBS | DIASTOLIC BLOOD PRESSURE: 84 MMHG

## 2024-05-07 DIAGNOSIS — Z79.01 LONG TERM CURRENT USE OF ANTICOAGULANT THERAPY: ICD-10-CM

## 2024-05-07 DIAGNOSIS — M54.42 CHRONIC BILATERAL LOW BACK PAIN WITH BILATERAL SCIATICA: ICD-10-CM

## 2024-05-07 DIAGNOSIS — G89.29 CHRONIC BILATERAL LOW BACK PAIN WITH BILATERAL SCIATICA: ICD-10-CM

## 2024-05-07 DIAGNOSIS — K02.9 DENTAL CAVITIES: ICD-10-CM

## 2024-05-07 DIAGNOSIS — I82.409 RECURRENT DEEP VEIN THROMBOSIS (DVT) (HCC): ICD-10-CM

## 2024-05-07 DIAGNOSIS — L72.9 SCALP CYST: Primary | ICD-10-CM

## 2024-05-07 DIAGNOSIS — M54.41 CHRONIC BILATERAL LOW BACK PAIN WITH BILATERAL SCIATICA: ICD-10-CM

## 2024-05-07 PROCEDURE — 99213 OFFICE O/P EST LOW 20 MIN: CPT | Performed by: FAMILY MEDICINE

## 2024-05-07 NOTE — PROGRESS NOTES
Plan:   1. Scalp cyst-being removed by Dr. De Luna.  Patient would stop his Eliquis 48 hours preprocedure and as long as there are no bleeding complications post procedure, he can restart his Eliquis 48 hours later.  2. Dental cavities- recently filled and per patient the dentist is postponing any root canals or any restorative dental procedures at this time.  However regarding his anticoagulation he would follow the same protocols as above for his upcoming scalp cyst removal.  3. Recurrent deep vein thrombosis (DVT) (HCC)  4. Chronic bilateral low back pain with bilateral sciatica-on gabapentin 3 times a day.  Would consider switch over to Lyrica as a trial if gabapentin stops working.  No longer seeing pain specialty at this time.  5. Long term current use of anticoagulant therapy       All patient questions answered.  Pt voiced understanding.     Return for Keep upcoming appointment in this office.  -----------------------------------------------------------------------------------------------            Chief Complaint   Patient presents with    Other     Follow up    Follow-up     Patient is here for follow-up.  He is currently working with his dentist to recently filled some cavities and is evaluating how they do with potential restorative native procedures and reconstruction as well as a root canal if needed.  So far since the cavity feeling he is doing well and so procedures are currently on hold with the dentist.  He does have an upcoming appointment with Dr. De Luna, dermatology to remove a scalp cyst.  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  His chronic pain is stable on gabapentin 3 times a day.  He still working regularly.  Moods are doing fine.  On Eliquis without any bleeding or bruising complications currently.      ROS: Pertinent items are noted in HPI. All

## 2024-10-22 ENCOUNTER — OFFICE VISIT (OUTPATIENT)
Dept: FAMILY MEDICINE CLINIC | Age: 49
End: 2024-10-22
Payer: COMMERCIAL

## 2024-10-22 VITALS
HEIGHT: 68 IN | SYSTOLIC BLOOD PRESSURE: 124 MMHG | DIASTOLIC BLOOD PRESSURE: 74 MMHG | OXYGEN SATURATION: 96 % | HEART RATE: 76 BPM | WEIGHT: 200 LBS | BODY MASS INDEX: 30.31 KG/M2

## 2024-10-22 DIAGNOSIS — M54.42 CHRONIC BILATERAL LOW BACK PAIN WITH BILATERAL SCIATICA: ICD-10-CM

## 2024-10-22 DIAGNOSIS — G89.29 CHRONIC BILATERAL LOW BACK PAIN WITH BILATERAL SCIATICA: ICD-10-CM

## 2024-10-22 DIAGNOSIS — G89.4 CHRONIC PAIN SYNDROME: ICD-10-CM

## 2024-10-22 DIAGNOSIS — Z13.220 ENCOUNTER FOR LIPID SCREENING FOR CARDIOVASCULAR DISEASE: ICD-10-CM

## 2024-10-22 DIAGNOSIS — Z13.6 ENCOUNTER FOR LIPID SCREENING FOR CARDIOVASCULAR DISEASE: ICD-10-CM

## 2024-10-22 DIAGNOSIS — M54.41 CHRONIC BILATERAL LOW BACK PAIN WITH BILATERAL SCIATICA: ICD-10-CM

## 2024-10-22 DIAGNOSIS — Z13.1 SCREENING FOR DIABETES MELLITUS: ICD-10-CM

## 2024-10-22 DIAGNOSIS — Z00.00 ENCOUNTER FOR WELL ADULT EXAM WITHOUT ABNORMAL FINDINGS: Primary | ICD-10-CM

## 2024-10-22 PROCEDURE — 99396 PREV VISIT EST AGE 40-64: CPT | Performed by: FAMILY MEDICINE

## 2024-10-22 SDOH — ECONOMIC STABILITY: FOOD INSECURITY: WITHIN THE PAST 12 MONTHS, THE FOOD YOU BOUGHT JUST DIDN'T LAST AND YOU DIDN'T HAVE MONEY TO GET MORE.: NEVER TRUE

## 2024-10-22 SDOH — ECONOMIC STABILITY: FOOD INSECURITY: WITHIN THE PAST 12 MONTHS, YOU WORRIED THAT YOUR FOOD WOULD RUN OUT BEFORE YOU GOT MONEY TO BUY MORE.: NEVER TRUE

## 2024-10-22 SDOH — ECONOMIC STABILITY: INCOME INSECURITY: HOW HARD IS IT FOR YOU TO PAY FOR THE VERY BASICS LIKE FOOD, HOUSING, MEDICAL CARE, AND HEATING?: NOT HARD AT ALL

## 2024-10-22 NOTE — PROGRESS NOTES
Well Adult Note  Name: Sony Wilhelm Today’s Date: 10/22/2024   MRN: [id number] Sex: Male   Age: 49 y.o. Ethnicity: Non- / Non    : 1975 Race: White (non-)      Sony Wilhelm is here for a well adult exam.       Subjective   History:  Chief Complaint   Patient presents with    Annual Exam   Still has daily pain in back and legs. Would like to see pain clinic to see if there are options for him. Working full time.  Gabapentin not helping as much short of improving sleep.  Left foot drop gets cumbersome on unpaved ground, so he uses cane if needing to walk in those places. No falls. Patient denies any exertional chest pain, dyspnea, palpitations, syncope, orthopnea, edema or paroxysmal nocturnal dyspnea.  Allergies stable on prn singulair. No side effects from current meds. On elliquis for VTE, The patient denies abnormal bruising or abnormal bleeding from any body orifice such as bleeding from nose or gums, blood in urine or stool, or melena, hemoptysis or hematemesis.    The patient denies abdominal or flank pain, anorexia, nausea or vomiting, dysphagia, change in bowel habits or black or bloody stools or weight loss.        Review of Systems per HPI    No Known Allergies  Prior to Visit Medications    Medication Sig Taking? Authorizing Provider   gabapentin (NEURONTIN) 300 MG capsule Take 1 capsule by mouth 3 times daily. Yes Danni Valadez MD   ELIQUIS 2.5 MG TABS tablet TAKE 1 TABLET BY MOUTH TWICE A DAY Yes Danni Valadez MD   montelukast (SINGULAIR) 10 MG tablet Take 1 tablet by mouth daily Yes Danni Valadez MD   sildenafil (VIAGRA) 50 MG tablet Take 1 tablet by mouth daily as needed for Erectile Dysfunction Yes Danni Valadez MD   UNABLE TO FIND TENS unit with 4 lead and corresponding equiptment Yes Danni Valadez MD   docusate sodium (COLACE) 100 MG capsule TAKE 1 CAPSULE BY MOUTH TWICE A DAY AS

## 2024-11-21 DIAGNOSIS — Z79.01 LONG TERM CURRENT USE OF ANTICOAGULANT THERAPY: ICD-10-CM

## 2024-11-21 DIAGNOSIS — I82.409 RECURRENT DEEP VEIN THROMBOSIS (DVT) (HCC): ICD-10-CM

## 2024-12-26 ENCOUNTER — E-VISIT (OUTPATIENT)
Dept: PRIMARY CARE CLINIC | Age: 49
End: 2024-12-26

## 2024-12-26 DIAGNOSIS — J06.9 URI, ACUTE: Primary | ICD-10-CM

## 2024-12-26 ASSESSMENT — LIFESTYLE VARIABLES: SMOKING_STATUS: NO, I'VE NEVER SMOKED

## 2024-12-27 RX ORDER — AZITHROMYCIN 250 MG/1
250 TABLET, FILM COATED ORAL SEE ADMIN INSTRUCTIONS
Qty: 6 TABLET | Refills: 0 | Status: SHIPPED | OUTPATIENT
Start: 2024-12-27 | End: 2025-01-01

## 2024-12-27 NOTE — PROGRESS NOTES
Sony Wilhelm (1975) initiated an asynchronous digital communication through [employer].    HPI: per patient questionnaire     Exam: not applicable    Diagnoses and all orders for this visit:  There are no diagnoses linked to this encounter.      Time: EV1 - 5-10 minutes were spent on the digital evaluation and management of this patient.    Emma Gallegos, JAVIER - NP

## 2025-04-23 ENCOUNTER — PATIENT MESSAGE (OUTPATIENT)
Dept: FAMILY MEDICINE CLINIC | Age: 50
End: 2025-04-23

## 2025-04-27 DIAGNOSIS — M54.42 CHRONIC BILATERAL LOW BACK PAIN WITH BILATERAL SCIATICA: ICD-10-CM

## 2025-04-27 DIAGNOSIS — M54.41 CHRONIC BILATERAL LOW BACK PAIN WITH BILATERAL SCIATICA: ICD-10-CM

## 2025-04-27 DIAGNOSIS — Z79.01 LONG TERM CURRENT USE OF ANTICOAGULANT THERAPY: ICD-10-CM

## 2025-04-27 DIAGNOSIS — I82.409 RECURRENT DEEP VEIN THROMBOSIS (DVT) (HCC): ICD-10-CM

## 2025-04-27 DIAGNOSIS — G89.29 CHRONIC BILATERAL LOW BACK PAIN WITH BILATERAL SCIATICA: ICD-10-CM

## 2025-04-28 RX ORDER — GABAPENTIN 300 MG/1
300 CAPSULE ORAL 3 TIMES DAILY
Qty: 270 CAPSULE | Refills: 3 | Status: SHIPPED | OUTPATIENT
Start: 2025-04-28 | End: 2026-04-28

## 2025-04-28 RX ORDER — MONTELUKAST SODIUM 10 MG/1
10 TABLET ORAL DAILY
Qty: 90 TABLET | Refills: 3 | Status: SHIPPED | OUTPATIENT
Start: 2025-04-28

## 2025-05-23 PROBLEM — Z79.891 LONG TERM (CURRENT) USE OF OPIATE ANALGESIC: Status: RESOLVED | Noted: 2019-01-08 | Resolved: 2025-05-23

## 2025-06-13 ENCOUNTER — PATIENT MESSAGE (OUTPATIENT)
Dept: FAMILY MEDICINE CLINIC | Age: 50
End: 2025-06-13

## 2025-06-13 NOTE — TELEPHONE ENCOUNTER
Your message has been routed to the provider who will respond at her next available moment. Thank you

## (undated) DEVICE — ENDOSCOPY KIT: Brand: MEDLINE INDUSTRIES, INC.

## (undated) DEVICE — SNARE VASC L240CM LOOP W10MM SHTH DIA2.4MM RND STIFF CLD